# Patient Record
Sex: FEMALE | Race: BLACK OR AFRICAN AMERICAN | NOT HISPANIC OR LATINO | Employment: FULL TIME | URBAN - METROPOLITAN AREA
[De-identification: names, ages, dates, MRNs, and addresses within clinical notes are randomized per-mention and may not be internally consistent; named-entity substitution may affect disease eponyms.]

---

## 2017-02-06 DIAGNOSIS — E87.5 HYPERKALEMIA: Primary | ICD-10-CM

## 2017-02-11 ENCOUNTER — RESULTS ENCOUNTER (OUTPATIENT)
Dept: INTERNAL MEDICINE | Facility: CLINIC | Age: 57
End: 2017-02-11

## 2017-02-11 DIAGNOSIS — E87.5 HYPERKALEMIA: ICD-10-CM

## 2017-02-20 ENCOUNTER — TELEPHONE (OUTPATIENT)
Dept: ENDOCRINOLOGY | Age: 57
End: 2017-02-20

## 2017-02-20 ENCOUNTER — OFFICE VISIT (OUTPATIENT)
Dept: ENDOCRINOLOGY | Age: 57
End: 2017-02-20

## 2017-02-20 VITALS
HEIGHT: 67 IN | DIASTOLIC BLOOD PRESSURE: 72 MMHG | SYSTOLIC BLOOD PRESSURE: 116 MMHG | WEIGHT: 174.4 LBS | HEART RATE: 72 BPM | OXYGEN SATURATION: 98 % | BODY MASS INDEX: 27.37 KG/M2

## 2017-02-20 DIAGNOSIS — I10 ESSENTIAL HYPERTENSION: ICD-10-CM

## 2017-02-20 DIAGNOSIS — G47.33 OBSTRUCTIVE SLEEP APNEA ON CPAP: ICD-10-CM

## 2017-02-20 DIAGNOSIS — I25.10 ARTERIOSCLEROSIS OF CORONARY ARTERY: Primary | ICD-10-CM

## 2017-02-20 DIAGNOSIS — Z79.4 TYPE 2 DIABETES MELLITUS WITH COMPLICATION, WITH LONG-TERM CURRENT USE OF INSULIN (HCC): ICD-10-CM

## 2017-02-20 DIAGNOSIS — I25.5 ISCHEMIC CARDIOMYOPATHY: ICD-10-CM

## 2017-02-20 DIAGNOSIS — E78.5 HYPERLIPIDEMIA, UNSPECIFIED HYPERLIPIDEMIA TYPE: ICD-10-CM

## 2017-02-20 DIAGNOSIS — E08.319 DIABETIC RETINOPATHY ASSOCIATED WITH DIABETES MELLITUS DUE TO UNDERLYING CONDITION, MACULAR EDEMA PRESENCE UNSPECIFIED, UNSPECIFIED RETINOPATHY SEVERITY: ICD-10-CM

## 2017-02-20 DIAGNOSIS — E11.8 TYPE 2 DIABETES MELLITUS WITH COMPLICATION, WITH LONG-TERM CURRENT USE OF INSULIN (HCC): ICD-10-CM

## 2017-02-20 DIAGNOSIS — R80.9 MICROALBUMINURIA: ICD-10-CM

## 2017-02-20 DIAGNOSIS — Z99.89 OBSTRUCTIVE SLEEP APNEA ON CPAP: ICD-10-CM

## 2017-02-20 PROCEDURE — 99214 OFFICE O/P EST MOD 30 MIN: CPT | Performed by: INTERNAL MEDICINE

## 2017-02-20 RX ORDER — BUMETANIDE 2 MG/1
2 TABLET ORAL 2 TIMES DAILY
COMMUNITY

## 2017-02-20 RX ORDER — HYDRALAZINE HYDROCHLORIDE 50 MG/1
50 TABLET, FILM COATED ORAL 3 TIMES DAILY
COMMUNITY

## 2017-02-20 RX ORDER — POTASSIUM CHLORIDE 750 MG/1
10 TABLET, FILM COATED, EXTENDED RELEASE ORAL DAILY
COMMUNITY
End: 2017-07-14

## 2017-02-20 NOTE — TELEPHONE ENCOUNTER
----- Message from Ivory Shepard sent at 2/20/2017  3:02 PM EST -----  [facility] on nikia Mccarty pharmacist please call [phone]

## 2017-02-20 NOTE — PROGRESS NOTES
Subjective   Noemí Pedraza is a 56 y.o. female.     HPI Comments: F/u for dm 2 testing bs 1-2 x day / last dm eye exam sept 2016/ last dm foot exam today with dr Bowman     Diabetes     Patient is a 56-year-old female came in for follow-up  Patient has known diabetes mellitus since 1997 and started on insulin in 2009. She is presently on Toujeo 28 units every AM and 10 units every PM  and Humalog 0 units at breakfast, 6 units at lunch, and 6 units at supper. She checks her blood sugar 1 time per day.   Fasting blood sugar runs between 55 -104.  She is compliant with her diet. She has occasional hypoglycemia in the early morning. She has lost 30 pounds since March 2016.   Her last meal was at 10 AM.     Her last eye examination was 12/16 with Dr. Smith. She had previous retinal laser treatment. She has microalbuminuria on urine sample taken last July 2015. She was on valsartan in the past but does not know why it was taken off.. She complains of occasional tingling in her feet but no pain.     She has hyperlipidemia and stopped pravastatin since December 2016 because of leg shaking at night. She denies any myalgia. Lipitor cause muscle weakness in the past. She has not used Crestor or Zocor before.     She has history of coronary disease and ischemic cardiomyopathy and has a defibrillator in place. She had a myocardial infarction in 2009. She denies any chest pain, orthopnea, or paroxysmal nocturnal dyspnea. She has not had any defibrillator discharge. She was started on amiodarone in January 2016. She was admitted at [facility] in January 2017 for congestive heart failure.  She is on isosorbide dinitrate 20 mg 3 times a day, hydralazine 50 mg 3 times a day, Bumex 2 mg once a day, and amiodarone 200 mg once a day.  She follows with Dr. Woodard.    She has no previous history of thyroid disease.  Thyroid function test done in September 2016 showed a normal free T4 at 1.29 ng per DL and a mildly elevated TSH of  "4.480 with an upper limit of normal of 4.2.    The following portions of the patient's history were reviewed and updated as appropriate: allergies, current medications, past family history, past medical history, past social history, past surgical history and problem list.    Review of Systems   Constitutional: Negative.    HENT: Negative.    Eyes: Negative.    Respiratory: Negative.    Cardiovascular: Negative.    Gastrointestinal: Negative.    Endocrine: Negative.    Genitourinary: Positive for frequency.   Musculoskeletal: Negative.    Skin: Negative.    Allergic/Immunologic: Negative.    Neurological: Negative.    Hematological: Bruises/bleeds easily (on aspirin ).   Psychiatric/Behavioral: Positive for sleep disturbance (sleep apnea on c pap machine ).       Objective      Vitals:    02/20/17 1354   BP: 116/72   BP Location: Right arm   Patient Position: Sitting   Cuff Size: Large Adult   Pulse: 72   SpO2: 98%   Weight: 174 lb 6.4 oz (79.1 kg)   Height: 67\" (170.2 cm)     Physical Exam   Constitutional: She is oriented to person, place, and time. She appears well-developed and well-nourished. No distress.   HENT:   Head: Normocephalic.   Nose: Nose normal.   Mouth/Throat: No oropharyngeal exudate.   Eyes: Conjunctivae and EOM are normal. Right eye exhibits no discharge. Left eye exhibits no discharge. No scleral icterus.   Neck: Neck supple. No JVD present. No tracheal deviation present. No thyromegaly present.   Cardiovascular: Normal rate, regular rhythm, normal heart sounds and intact distal pulses.  Exam reveals no friction rub.    No murmur heard.  Palpable dorsalis pedis pulses   Pulmonary/Chest: Effort normal and breath sounds normal. No respiratory distress. She has no wheezes. She has no rales.   Abdominal: Soft. Bowel sounds are normal. She exhibits no distension and no mass. There is no tenderness.   Musculoskeletal: Normal range of motion. She exhibits no edema or tenderness.   No plantar ulcers "   Lymphadenopathy:     She has no cervical adenopathy.   Neurological: She is alert and oriented to person, place, and time. She displays normal reflexes.   Intact light touch and vibration on both lower extremities   Skin: Skin is warm and dry. No pallor.   Psychiatric: She has a normal mood and affect. Her behavior is normal.     Office Visit on 09/21/2016   Component Date Value Ref Range Status   • Hemoglobin A1C 09/21/2016 8.50* 4.80 - 5.60 % Final    Comment: Hemoglobin A1C Ranges:  Increased Risk for Diabetes  5.7% to 6.4%  Diabetes                     >= 6.5%  Diabetic Goal                < 7.0%     • WBC 09/21/2016 5.41  4.50 - 10.70 10*3/mm3 Final   • RBC 09/21/2016 5.14  3.90 - 5.20 10*6/mm3 Final   • Hemoglobin 09/21/2016 14.4  11.9 - 15.5 g/dL Final   • Hematocrit 09/21/2016 44.2  35.6 - 45.5 % Final   • MCV 09/21/2016 86.0  80.5 - 98.2 fL Final   • MCH 09/21/2016 28.0  26.9 - 32.7 pg Final   • MCHC 09/21/2016 32.6  32.4 - 36.3 g/dL Final   • RDW 09/21/2016 14.8* 11.7 - 13.0 % Final   • Platelets 09/21/2016 183  140 - 500 10*3/mm3 Final   • Neutrophil Rel % 09/21/2016 59.9  42.7 - 76.0 % Final   • Lymphocyte Rel % 09/21/2016 28.3  19.6 - 45.3 % Final   • Monocyte Rel % 09/21/2016 8.5  5.0 - 12.0 % Final   • Eosinophil Rel % 09/21/2016 3.1  0.3 - 6.2 % Final   • Basophil Rel % 09/21/2016 0.2  0.0 - 1.5 % Final   • Neutrophils Absolute 09/21/2016 3.24  1.90 - 8.10 10*3/mm3 Final   • Lymphocytes Absolute 09/21/2016 1.53  0.90 - 4.80 10*3/mm3 Final   • Monocytes Absolute 09/21/2016 0.46  0.20 - 1.20 10*3/mm3 Final   • Eosinophils Absolute 09/21/2016 0.17  0.00 - 0.70 10*3/mm3 Final   • Basophils Absolute 09/21/2016 0.01  0.00 - 0.20 10*3/mm3 Final   • Immature Granulocyte Rel % 09/21/2016 0.0  0.0 - 0.5 % Final   • Immature Grans Absolute 09/21/2016 0.00  0.00 - 0.03 10*3/mm3 Final   • Glucose 09/21/2016 95  65 - 99 mg/dL Final   • BUN 09/21/2016 30* 6 - 20 mg/dL Final   • Creatinine 09/21/2016 1.21* 0.57 -  1.00 mg/dL Final   • eGFR Non  Am 09/21/2016 46* >60 mL/min/1.73 Final    <15 Indicative of kidney failure.   • eGFR  Am 09/21/2016 56* >60 mL/min/1.73 Final    <15 Indicative of kidney failure.   • BUN/Creatinine Ratio 09/21/2016 24.8  7.0 - 25.0 Final   • Sodium 09/21/2016 142  136 - 145 mmol/L Final   • Potassium 09/21/2016 4.4  3.5 - 5.2 mmol/L Final   • Chloride 09/21/2016 96* 98 - 107 mmol/L Final   • Total CO2 09/21/2016 30.8* 22.0 - 29.0 mmol/L Final   • Calcium 09/21/2016 9.8  8.6 - 10.5 mg/dL Final   • Total Protein 09/21/2016 6.8  6.0 - 8.5 g/dL Final   • Albumin 09/21/2016 3.70  3.50 - 5.20 g/dL Final   • Globulin 09/21/2016 3.1  gm/dL Final   • A/G Ratio 09/21/2016 1.2  g/dL Final   • Total Bilirubin 09/21/2016 0.4  0.1 - 1.2 mg/dL Final   • Alkaline Phosphatase 09/21/2016 192* 39 - 117 U/L Final   • AST (SGOT) 09/21/2016 26  1 - 32 U/L Final   • ALT (SGPT) 09/21/2016 20  1 - 33 U/L Final   • Free T4 09/21/2016 1.29  0.93 - 1.70 ng/dL Final   • TSH 09/21/2016 4.480* 0.270 - 4.200 mIU/mL Final   • Specific Gravity, UA 09/21/2016 1.016  1.005 - 1.030 Final   • pH, UA 09/21/2016 6.0  5.0 - 8.0 Final   • Color, UA 09/21/2016 Yellow   Final    REFERENCE RANGE: Yellow, Straw   • Appearance, UA 09/21/2016 Clear  Clear Final   • Leukocytes, UA 09/21/2016 Comment  Negative Final    Negative   • Protein 09/21/2016 See below:* Negative Final    100 mg/dL (2+)   • Glucose, UA 09/21/2016 Comment  Negative Final    Negative   • Ketones 09/21/2016 Comment  Negative Final    Negative   • Blood, UA 09/21/2016 See below:* Negative Final    Small (1+)   • Bilirubin, UA 09/21/2016 Comment  Negative Final    Negative   • Urobilinogen, UA 09/21/2016 Comment   Final    Comment: 0.2 E.U./dL  REFERENCE RANGE: 0.2 E.U./dL, 1.0 E.U./dL     • Nitrite, UA 09/21/2016 Comment  Negative Final    Negative   • WBC 09/21/2016 0-2* None Seen /hpf Final   • RBC, UA 09/21/2016 0-2* None Seen /hpf Final   • Epithelial Cells  (non renal) 09/21/2016 3-6* /hpf Final    REFERENCE RANGE: None Seen, 0-2   • Cast Type 09/21/2016 Comment   Final    HYALINE CASTS, UA            0-2              /LPF       None Seen  N   • Bacteria, UA 09/21/2016 Comment  None Seen /hpf Final    None Seen     Assessment/Plan   Noemí was seen today for diabetes.    Diagnoses and all orders for this visit:    Arteriosclerosis of coronary artery    Essential hypertension    Obstructive sleep apnea on CPAP    Ischemic cardiomyopathy    Type 2 diabetes mellitus with complication, with long-term current use of insulin  -     Discontinue: Insulin Glargine (TOUJEO SOLOSTAR) 300 UNIT/ML solution pen-injector; Inject 52 Units under the skin Daily. 35 units every morning  -     Comprehensive Metabolic Panel  -     Microalbumin / Creatinine Urine Ratio  -     Hemoglobin A1c  -     Lipid Panel  -     TSH  -     T4, Free  -     Insulin Glargine (TOUJEO SOLOSTAR) 300 UNIT/ML solution pen-injector; Inject 35 Units under the skin Daily.    Microalbuminuria  -     Microalbumin / Creatinine Urine Ratio  -     DIEGO + PE    Diabetic retinopathy associated with diabetes mellitus due to underlying condition, macular edema presence unspecified, unspecified retinopathy severity    Hyperlipidemia, unspecified hyperlipidemia type  -     Lipid Panel  -     TSH  -     T4, Free      Decrease Toujeo to 35 units every morning.  Continue mealtime Humalog.  Check blood sugars before meals and at bedtime and call with results in one week.  Check lipid profile.  Consider Livalo.  Check thyroid function tests.    Send copy of my notes and labs to Dr. Foote and Dr. Woodard.    RTC 4 mos.

## 2017-02-21 LAB
ALBUMIN SERPL ELPH-MCNC: 4 G/DL (ref 2.9–4.4)
ALBUMIN SERPL-MCNC: 4.1 G/DL (ref 3.5–5.2)
ALBUMIN/CREAT UR: 437.6 MG/G CREAT (ref 0–30)
ALBUMIN/GLOB SERPL: 1.3 G/DL
ALBUMIN/GLOB SERPL: 1.3 {RATIO} (ref 0.7–1.7)
ALP SERPL-CCNC: 217 U/L (ref 39–117)
ALPHA1 GLOB SERPL ELPH-MCNC: 0.2 G/DL (ref 0–0.4)
ALPHA2 GLOB SERPL ELPH-MCNC: 0.8 G/DL (ref 0.4–1)
ALT SERPL-CCNC: 33 U/L (ref 1–33)
AST SERPL-CCNC: 42 U/L (ref 1–32)
B-GLOBULIN SERPL ELPH-MCNC: 1.3 G/DL (ref 0.7–1.3)
BILIRUB SERPL-MCNC: 0.3 MG/DL (ref 0.1–1.2)
BUN SERPL-MCNC: 35 MG/DL (ref 6–20)
BUN/CREAT SERPL: 27.1 (ref 7–25)
CALCIUM SERPL-MCNC: 9.5 MG/DL (ref 8.6–10.5)
CHLORIDE SERPL-SCNC: 98 MMOL/L (ref 98–107)
CHOLEST SERPL-MCNC: 275 MG/DL (ref 0–200)
CO2 SERPL-SCNC: 30.7 MMOL/L (ref 22–29)
CREAT SERPL-MCNC: 1.29 MG/DL (ref 0.57–1)
CREAT UR-MCNC: 26.6 MG/DL
GAMMA GLOB SERPL ELPH-MCNC: 1 G/DL (ref 0.4–1.8)
GLOBULIN SER CALC-MCNC: 3.2 GM/DL
GLOBULIN SER-MCNC: 3.3 G/DL (ref 2.2–3.9)
GLUCOSE SERPL-MCNC: 73 MG/DL (ref 65–99)
HBA1C MFR BLD: 7.48 % (ref 4.8–5.6)
HDLC SERPL-MCNC: 81 MG/DL (ref 40–60)
IGA SERPL-MCNC: 193 MG/DL (ref 87–352)
IGG SERPL-MCNC: 1301 MG/DL (ref 700–1600)
IGM SERPL-MCNC: 65 MG/DL (ref 26–217)
INTERPRETATION SERPL IEP-IMP: NORMAL
LDLC SERPL CALC-MCNC: 172 MG/DL (ref 0–100)
Lab: NORMAL
M PROTEIN SERPL ELPH-MCNC: NORMAL G/DL
MICROALBUMIN UR-MCNC: 116.4 UG/ML
POTASSIUM SERPL-SCNC: 4.2 MMOL/L (ref 3.5–5.2)
PROT SERPL-MCNC: 7.3 G/DL (ref 6–8.5)
SODIUM SERPL-SCNC: 143 MMOL/L (ref 136–145)
T4 FREE SERPL-MCNC: 1.12 NG/DL (ref 0.93–1.7)
TRIGL SERPL-MCNC: 108 MG/DL (ref 0–150)
TSH SERPL DL<=0.005 MIU/L-ACNC: 4.04 MIU/ML (ref 0.27–4.2)
VLDLC SERPL CALC-MCNC: 21.6 MG/DL (ref 5–40)

## 2017-02-22 ENCOUNTER — TELEPHONE (OUTPATIENT)
Dept: ENDOCRINOLOGY | Age: 57
End: 2017-02-22

## 2017-02-22 DIAGNOSIS — I10 ESSENTIAL HYPERTENSION: Primary | ICD-10-CM

## 2017-02-22 DIAGNOSIS — E78.49 OTHER HYPERLIPIDEMIA: ICD-10-CM

## 2017-02-22 NOTE — TELEPHONE ENCOUNTER
----- Message from Ivory Shepard sent at 2/22/2017 12:41 PM EST -----  Patient is returning you call regarding labs

## 2017-03-08 ENCOUNTER — OFFICE VISIT (OUTPATIENT)
Dept: INTERNAL MEDICINE | Facility: CLINIC | Age: 57
End: 2017-03-08

## 2017-03-08 VITALS
HEIGHT: 67 IN | TEMPERATURE: 97.3 F | WEIGHT: 178 LBS | BODY MASS INDEX: 27.94 KG/M2 | OXYGEN SATURATION: 96 % | DIASTOLIC BLOOD PRESSURE: 76 MMHG | SYSTOLIC BLOOD PRESSURE: 125 MMHG | HEART RATE: 80 BPM

## 2017-03-08 DIAGNOSIS — Z01.419 ENCOUNTER FOR GYNECOLOGICAL EXAMINATION WITHOUT ABNORMAL FINDING: ICD-10-CM

## 2017-03-08 DIAGNOSIS — G47.33 OBSTRUCTIVE SLEEP APNEA ON CPAP: ICD-10-CM

## 2017-03-08 DIAGNOSIS — Z99.89 OBSTRUCTIVE SLEEP APNEA ON CPAP: ICD-10-CM

## 2017-03-08 DIAGNOSIS — IMO0001 IDDM (INSULIN DEPENDENT DIABETES MELLITUS): ICD-10-CM

## 2017-03-08 DIAGNOSIS — J45.991 COUGH VARIANT ASTHMA: ICD-10-CM

## 2017-03-08 DIAGNOSIS — I10 ESSENTIAL HYPERTENSION: ICD-10-CM

## 2017-03-08 DIAGNOSIS — Z00.00 MEDICARE ANNUAL WELLNESS VISIT, INITIAL: ICD-10-CM

## 2017-03-08 DIAGNOSIS — I25.10 CORONARY ARTERY DISEASE INVOLVING NATIVE CORONARY ARTERY OF NATIVE HEART WITHOUT ANGINA PECTORIS: ICD-10-CM

## 2017-03-08 DIAGNOSIS — Z09 HOSPITAL DISCHARGE FOLLOW-UP: Primary | ICD-10-CM

## 2017-03-08 DIAGNOSIS — I50.22 CHRONIC SYSTOLIC CONGESTIVE HEART FAILURE (HCC): ICD-10-CM

## 2017-03-08 DIAGNOSIS — Z12.11 ENCOUNTER FOR SCREENING COLONOSCOPY: ICD-10-CM

## 2017-03-08 DIAGNOSIS — E78.49 OTHER HYPERLIPIDEMIA: ICD-10-CM

## 2017-03-08 PROCEDURE — G0438 PPPS, INITIAL VISIT: HCPCS | Performed by: INTERNAL MEDICINE

## 2017-03-08 PROCEDURE — 99214 OFFICE O/P EST MOD 30 MIN: CPT | Performed by: INTERNAL MEDICINE

## 2017-03-08 NOTE — PATIENT INSTRUCTIONS
Medicare Wellness  Personal Prevention Plan of Service     Date of Office Visit:  2017  Encounter Provider:  Zay Foote MD  Place of Service:  [facility]  Patient Name: Noemí Pedraza  :  1960    As part of the Medicare Wellness portion of your visit today, we are providing you with this personalized preventive plan of services (PPPS). This plan is based upon recommendations of the [place] Preventive Services Task Force (USPSTF) and the Advisory Committee on Immunization Practices (ACIP).    This lists the preventive care services that should be considered, and provides dates of when you are due. Items listed as completed are up-to-date and do not require any further intervention.    Health Maintenance   Topic Date Due   • TDAP/TD VACCINES (1 - Tdap) 1977   • COLONOSCOPY  2016   • PAP SMEAR  2016   • DIABETIC EYE EXAM  2017   • LIPID PANEL  2018   • HEMOGLOBIN A1C  2018   • URINE MICROALBUMIN  2018   • MEDICARE ANNUAL WELLNESS  2018   • DIABETIC FOOT EXAM  2018   • MAMMOGRAM  2018   • PNEUMOCOCCAL VACCINE (19-64 MEDIUM RISK)  Addressed   • HEPATITIS C SCREENING  Addressed   • INFLUENZA VACCINE  Addressed         Patient Self-Management and Personalized Health Advice  The patient has been provided with information about: diet and preventive services including:   · Nutrition counseling provided.    Visit Diagnoses:  No diagnosis found.    No orders of the defined types were placed in this encounter.      Outpatient Encounter Prescriptions as of 3/8/2017   Medication Sig Dispense Refill   • amiodarone (PACERONE) 200 MG tablet Take 200 mg by mouth daily.     • aspirin 81 MG tablet Take 81 mg by mouth daily.     • bumetanide (BUMEX) 2 MG tablet Take 2 mg by mouth Daily.     • Coenzyme Q10 (CO Q-10) 100 MG capsule Take 1 tablet/day by mouth every day.     • ferrous sulfate 325 (65 FE) MG EC tablet  Take 65 mg by mouth Daily With Breakfast.     • glucose blood test strip  OneTouch Verio In Vitro Strip 4 TIMES DAILY   DX CODE E 11.65 200 each 1   • HUMALOG KWIKPEN 100 UNIT/ML solution pen-injector INJECT 10-14 UNITS 3 X DAILY (Patient taking differently: INJECT 6 units with dinner   Average 18 units daily) 15 pen 2   • hydrALAZINE (APRESOLINE) 50 MG tablet Take 50 mg by mouth 3 (Three) Times a Day.     • Insulin Glargine (TOUJEO SOLOSTAR) 300 UNIT/ML solution pen-injector Inject 35 Units under the skin Daily. 15 pen 1   • Insulin Pen Needle 32G X 4 MM misc      • isosorbide mononitrate (IMDUR) 60 MG 24 hr tablet Take 20 mg by mouth 3 (Three) Times a Day.     • mometasone-formoterol (DULERA 100) 100-5 MCG/ACT inhaler Inhale 2 puffs 2 (two) times a day. 3 inhaler 11   • ONETOUCH DELICA LANCETS 33G misc      • pitavastatin calcium (LIVALO) 2 MG tablet tablet Take 1/2 tablet at night 30 tablet 5   • potassium chloride (K-DUR,KLOR-CON) 10 MEQ ER tablet Take 10 mEq by mouth Daily.       No facility-administered encounter medications on file as of 3/8/2017.        Reviewed use of high risk medication in the elderly: yes  Reviewed for potential of harmful drug interactions in the elderly: yes    Follow Up:  No Follow-up on file.     An After Visit Summary and PPPS with all of these plans were given to the patient.

## 2017-03-08 NOTE — PROGRESS NOTES
Subjective   Noemí Pedraza is a 56 y.o. female.   She is here today for Medicare annual wellness visit initial along with Hospital discharge follow-up for chronic systolic congestive heart failure as well as coronary artery disease hypertension hyper lipidemia obstructive sleep apnea cough variant asthma insulin-dependent diabetes and encounter for screening colonoscopy and encounter for gynecological examination also  History of Present Illness   She is here today for Medicare annual wellness visit along with Hospital discharge follow-up for chronic systolic heart failure and coronary artery disease along with hypertension hyperlipidemia obstructive sleep apnea cough variant asthma insulin-dependent diabetes and encounter for screening colonoscopy and encounter for gynecological exam as well  The following portions of the patient's history were reviewed and updated as appropriate: allergies, current medications, past family history, past medical history, past social history, past surgical history and problem list.    Review of Systems   All other systems reviewed and are negative.      Objective   Physical Exam   Constitutional: She is oriented to person, place, and time. Vital signs are normal. She appears well-developed and well-nourished. She is active and cooperative.   HENT:   Head: Normocephalic and atraumatic.   Right Ear: Hearing, tympanic membrane, external ear and ear canal normal.   Left Ear: Hearing, tympanic membrane, external ear and ear canal normal.   Nose: Nose normal.   Mouth/Throat: Uvula is midline, oropharynx is clear and moist and mucous membranes are normal.   Eyes: Conjunctivae, EOM and lids are normal. Pupils are equal, round, and reactive to light. Right eye exhibits no discharge. Left eye exhibits no discharge.   Neck: Trachea normal, normal range of motion, full passive range of motion without pain and phonation normal. Neck supple. Carotid bruit is not present. No edema present. No  thyroid mass and no thyromegaly present.   Cardiovascular: Normal rate, regular rhythm, normal heart sounds, intact distal pulses and normal pulses.  Exam reveals no gallop and no friction rub.    No murmur heard.  Pulmonary/Chest: Effort normal and breath sounds normal. No respiratory distress. She has no wheezes. She has no rales.   Abdominal: Soft. Normal appearance, normal aorta and bowel sounds are normal. She exhibits no distension, no abdominal bruit and no mass. There is no hepatosplenomegaly. There is no tenderness. There is no rebound, no guarding and no CVA tenderness. No hernia. Hernia confirmed negative in the right inguinal area and confirmed negative in the left inguinal area.   Musculoskeletal: Normal range of motion. She exhibits no edema or tenderness.    Noemí had a diabetic foot exam performed today.   Monofilament test performed: nl.    Vascular Status -  Her exam exhibits right foot vasculature normal. Her exam exhibits no right foot edema. Her exam exhibits left foot vasculature normal. Her exam exhibits no left foot edema.   Skin Integrity  -  Her right foot skin is intact.     Noemí 's left foot skin is intact. .  Lymphadenopathy:     She has no cervical adenopathy.     She has no axillary adenopathy.        Right: No inguinal and no supraclavicular adenopathy present.        Left: No inguinal and no supraclavicular adenopathy present.   Neurological: She is alert and oriented to person, place, and time. She has normal strength. No cranial nerve deficit or sensory deficit. She exhibits normal muscle tone. She displays a negative Romberg sign. Coordination and gait normal.   Skin: Skin is warm, dry and intact. No cyanosis. Nails show no clubbing.   Psychiatric: She has a normal mood and affect. Her speech is normal and behavior is normal. Judgment and thought content normal. Cognition and memory are normal.   Nursing note and vitals reviewed.      Assessment/Plan   Diagnoses and all  orders for this visit:    Medicare annual wellness visit, subsequent    Hospital discharge follow-up    Chronic systolic congestive heart failure    Coronary artery disease involving native coronary artery of native heart without angina pectoris    Essential hypertension    Other hyperlipidemia    Obstructive sleep apnea on CPAP    Cough variant asthma    IDDM (insulin dependent diabetes mellitus)    Encounter for screening colonoscopy  -     Ambulatory Referral For Screening Colonoscopy    Encounter for gynecological examination without abnormal finding  -     Ambulatory Referral to Gynecology      Medicare annual wellness visit initial follow recommendations also the computer will not let me remove subsequent visit and replace with initial visit  Hospital discharge follow-up doing better  Current chronic systolic congestive heart failure stable  Hypertension well-controlled on current medication  Coronary artery disease stable now at this time after hospital stay  Hyper lipidemia keep LDL less than 70 with proper diet exercise medication  Obstructive sleep apnea continue CPAP  Cough variant asthma supportive meds for this  Insulin-dependent diabetes mellitus follow hemoglobin A1c  Encounter for screening colonoscopy schedule  Encounter for gynecological exam schedule

## 2017-03-08 NOTE — PROGRESS NOTES
QUICK REFERENCE INFORMATION:  The ABCs of the Annual Wellness Visit    Initial Medicare Wellness Visit    HEALTH RISK ASSESSMENT    1960    Recent Hospitalizations:  Recently treated at the following:  Other: [facility].        Current Medical Providers:  Patient Care Team:  Zay Foote MD as PCP - General  Zay Foote MD as PCP - Family Medicine  Beto Woodard MD as Consulting Physician (Cardiology)  Yane Mosley MD as Consulting Physician        Smoking Status:  History   Smoking Status   • Never Smoker   Smokeless Tobacco   • Not on file       Alcohol Consumption:  History   Alcohol Use   • Yes     Comment: SOCIALLY        Depression Screen:   PHQ-9 Depression Screening 3/8/2017   Little interest or pleasure in doing things 0   Feeling down, depressed, or hopeless 0   Trouble falling or staying asleep, or sleeping too much 0   Feeling tired or having little energy 0   Poor appetite or overeating 0   Feeling bad about yourself - or that you are a failure or have let yourself or your family down 0   Trouble concentrating on things, such as reading the newspaper or watching television 0   Moving or speaking so slowly that other people could have noticed. Or the opposite - being so fidgety or restless that you have been moving around a lot more than usual 0   Thoughts that you would be better off dead, or of hurting yourself in some way 0   PHQ-9 Total Score 0   If you checked off any problems, how difficult have these problems made it for you to do your work, take care of things at home, or get along with other people? Not difficult at all       Health Habits and Functional and Cognitive Screening:  Functional & Cognitive Status 3/8/2017   Do you have difficulty preparing food and eating? No   Do you have difficulty bathing yourself? No   Do you have difficulty getting dressed? No   Do you have difficulty using the toilet? No   Do you have difficulty moving around from place to  place? No   In the past year have you fallen or experienced a near fall? No   Do you need help using the phone?  No   Are you deaf or do you have serious difficulty hearing?  No   Do you need help with transportation? No   Do you need help shopping? No   Do you need help preparing meals?  No   Do you need help with housework?  No   Do you need help with laundry? No   Do you need help taking your medications? No   Do you need help managing money? No   Do you have difficulty concentrating, remembering or making decisions? No                  Does the patient have evidence of cognitive impairment? Yes    Asprin use counseling:yes      Recent Lab Results:    Visual Acuity:  No exam data present    Age-appropriate Screening Schedule:  Refer to the list below for future screening recommendations based on patient's age, sex and/or medical conditions. Orders for these recommended tests are listed in the plan section. The patient has been provided with a written plan.    Health Maintenance   Topic Date Due   • PNEUMOCOCCAL VACCINE (19-64 MEDIUM RISK) (1 of 1 - PPSV23) 12/29/1977   • TDAP/TD VACCINES (1 - Tdap) 12/29/1977   • DIABETIC FOOT EXAM  03/01/2016   • COLONOSCOPY  03/01/2016   • PAP SMEAR  03/31/2016   • DIABETIC EYE EXAM  12/15/2016   • LIPID PANEL  02/20/2018   • HEMOGLOBIN A1C  02/20/2018   • URINE MICROALBUMIN  02/20/2018   • MAMMOGRAM  05/04/2018   • INFLUENZA VACCINE  Addressed        Subjective   History of Present Illness    Noemí Pedraza is a 56 y.o. female who presents for an Annual Wellness Visit.    The following portions of the patient's history were reviewed and updated as appropriate: allergies, current medications, past family history, past medical history, past social history, past surgical history and problem list.    Outpatient Medications Prior to Visit   Medication Sig Dispense Refill   • amiodarone (PACERONE) 200 MG tablet Take 200 mg by mouth daily.     • aspirin 81 MG tablet Take 81 mg by mouth  daily.     • bumetanide (BUMEX) 2 MG tablet Take 2 mg by mouth Daily.     • Coenzyme Q10 (CO Q-10) 100 MG capsule Take 1 tablet/day by mouth every day.     • ferrous sulfate 325 (65 FE) MG EC tablet Take 65 mg by mouth Daily With Breakfast.     • glucose blood test strip  OneTouch Verio In Vitro Strip 4 TIMES DAILY   DX CODE E 11.65 200 each 1   • HUMALOG KWIKPEN 100 UNIT/ML solution pen-injector INJECT 10-14 UNITS 3 X DAILY (Patient taking differently: INJECT 6 units with dinner   Average 18 units daily) 15 pen 2   • hydrALAZINE (APRESOLINE) 50 MG tablet Take 50 mg by mouth 3 (Three) Times a Day.     • Insulin Glargine (TOUJEO SOLOSTAR) 300 UNIT/ML solution pen-injector Inject 35 Units under the skin Daily. 15 pen 1   • Insulin Pen Needle 32G X 4 MM misc      • isosorbide mononitrate (IMDUR) 60 MG 24 hr tablet Take 20 mg by mouth 3 (Three) Times a Day.     • mometasone-formoterol (DULERA 100) 100-5 MCG/ACT inhaler Inhale 2 puffs 2 (two) times a day. 3 inhaler 11   • ONETOUCH DELICA LANCETS 33G misc      • pitavastatin calcium (LIVALO) 2 MG tablet tablet Take 1/2 tablet at night 30 tablet 5   • potassium chloride (K-DUR,KLOR-CON) 10 MEQ ER tablet Take 10 mEq by mouth Daily.       No facility-administered medications prior to visit.        Patient Active Problem List   Diagnosis   • Arteriosclerosis of coronary artery   • CCF (congestive cardiac failure)   • Essential hypertension   • Coronary artery disease   • Hyperlipidemia   • Ischemic cardiomyopathy   • Obstructive sleep apnea on CPAP   • Diabetic retinopathy   • Microalbuminuria   • Diabetes mellitus type 2, uncontrolled   • Head revolving around   • Visit for screening mammogram   • Acne   • Hospital discharge follow-up   • History of laryngitis   • Cough variant asthma   • Type 2 diabetes mellitus with complication, with long-term current use of insulin       Advanced Care Planning:  has an advanced directive - a copy has been provided and is in  "file    Identification of Risk Factors:  Risk factors include: cardiovascular risk.    Review of Systems    Compared to one year ago, the patient feels her physical health is better.  Compared to one year ago, the patient feels her mental health is better.    Objective     Physical Exam    Vitals:    03/08/17 1006   BP: 125/76   BP Location: Left arm   Patient Position: Sitting   Cuff Size: Adult   Pulse: 80   Temp: 97.3 °F (36.3 °C)   TempSrc: Tympanic   SpO2: 96%   Weight: 178 lb (80.7 kg)   Height: 67\" (170.2 cm)       Body mass index is 27.88 kg/(m^2).  Discussed the patient's BMI with her. The BMI is in the acceptable range.    Assessment/Plan   Patient Self-Management and Personalized Health Advice  The patient has been provided with information about: diet and preventive services including:   · Nutrition counseling provided.    Visit Diagnoses:  No diagnosis found.    No orders of the defined types were placed in this encounter.      Outpatient Encounter Prescriptions as of 3/8/2017   Medication Sig Dispense Refill   • amiodarone (PACERONE) 200 MG tablet Take 200 mg by mouth daily.     • aspirin 81 MG tablet Take 81 mg by mouth daily.     • bumetanide (BUMEX) 2 MG tablet Take 2 mg by mouth Daily.     • Coenzyme Q10 (CO Q-10) 100 MG capsule Take 1 tablet/day by mouth every day.     • ferrous sulfate 325 (65 FE) MG EC tablet Take 65 mg by mouth Daily With Breakfast.     • glucose blood test strip  OneTouch Verio In Vitro Strip 4 TIMES DAILY   DX CODE E 11.65 200 each 1   • HUMALOG KWIKPEN 100 UNIT/ML solution pen-injector INJECT 10-14 UNITS 3 X DAILY (Patient taking differently: INJECT 6 units with dinner   Average 18 units daily) 15 pen 2   • hydrALAZINE (APRESOLINE) 50 MG tablet Take 50 mg by mouth 3 (Three) Times a Day.     • Insulin Glargine (TOUJEO SOLOSTAR) 300 UNIT/ML solution pen-injector Inject 35 Units under the skin Daily. 15 pen 1   • Insulin Pen Needle 32G X 4 MM misc      • isosorbide mononitrate " (IMDUR) 60 MG 24 hr tablet Take 20 mg by mouth 3 (Three) Times a Day.     • mometasone-formoterol (DULERA 100) 100-5 MCG/ACT inhaler Inhale 2 puffs 2 (two) times a day. 3 inhaler 11   • ONETOUCH DELICA LANCETS 33G misc      • pitavastatin calcium (LIVALO) 2 MG tablet tablet Take 1/2 tablet at night 30 tablet 5   • potassium chloride (K-DUR,KLOR-CON) 10 MEQ ER tablet Take 10 mEq by mouth Daily.       No facility-administered encounter medications on file as of 3/8/2017.        Reviewed use of high risk medication in the elderly: yes  Reviewed for potential of harmful drug interactions in the elderly: yes    Follow Up:  No Follow-up on file.     An After Visit Summary and PPPS with all of these plans were given to the patient.

## 2017-03-19 PROBLEM — Z00.00 MEDICARE ANNUAL WELLNESS VISIT, INITIAL: Status: ACTIVE | Noted: 2017-03-19

## 2017-04-22 ENCOUNTER — RESULTS ENCOUNTER (OUTPATIENT)
Dept: ENDOCRINOLOGY | Age: 57
End: 2017-04-22

## 2017-04-22 DIAGNOSIS — I10 ESSENTIAL HYPERTENSION: ICD-10-CM

## 2017-04-22 DIAGNOSIS — E78.49 OTHER HYPERLIPIDEMIA: ICD-10-CM

## 2017-06-12 RX ORDER — METHYLPREDNISOLONE 4 MG/1
TABLET ORAL
Qty: 21 TABLET | Refills: 0 | Status: SHIPPED | OUTPATIENT
Start: 2017-06-12 | End: 2017-07-10

## 2017-06-12 RX ORDER — AMOXICILLIN AND CLAVULANATE POTASSIUM 875; 125 MG/1; MG/1
1 TABLET, FILM COATED ORAL 2 TIMES DAILY
Qty: 20 TABLET | Refills: 0 | Status: SHIPPED | OUTPATIENT
Start: 2017-06-12 | End: 2017-07-10

## 2017-06-12 RX ORDER — AZITHROMYCIN 250 MG/1
TABLET, FILM COATED ORAL
Qty: 6 TABLET | Refills: 0 | Status: CANCELLED | OUTPATIENT
Start: 2017-06-12

## 2017-07-10 ENCOUNTER — OFFICE VISIT (OUTPATIENT)
Dept: OBSTETRICS AND GYNECOLOGY | Facility: CLINIC | Age: 57
End: 2017-07-10

## 2017-07-10 VITALS
BODY MASS INDEX: 28.72 KG/M2 | HEIGHT: 67 IN | SYSTOLIC BLOOD PRESSURE: 100 MMHG | WEIGHT: 183 LBS | DIASTOLIC BLOOD PRESSURE: 60 MMHG

## 2017-07-10 DIAGNOSIS — Z13.9 SCREENING: ICD-10-CM

## 2017-07-10 DIAGNOSIS — E08.319 DIABETIC RETINOPATHY ASSOCIATED WITH DIABETES MELLITUS DUE TO UNDERLYING CONDITION, MACULAR EDEMA PRESENCE UNSPECIFIED, UNSPECIFIED RETINOPATHY SEVERITY: ICD-10-CM

## 2017-07-10 DIAGNOSIS — Z99.89 OBSTRUCTIVE SLEEP APNEA ON CPAP: ICD-10-CM

## 2017-07-10 DIAGNOSIS — IMO0001 IDDM (INSULIN DEPENDENT DIABETES MELLITUS): ICD-10-CM

## 2017-07-10 DIAGNOSIS — G47.33 OBSTRUCTIVE SLEEP APNEA ON CPAP: ICD-10-CM

## 2017-07-10 DIAGNOSIS — R42: ICD-10-CM

## 2017-07-10 DIAGNOSIS — Z01.419 WELL FEMALE EXAM WITH ROUTINE GYNECOLOGICAL EXAM: Primary | ICD-10-CM

## 2017-07-10 DIAGNOSIS — I50.22 CHRONIC SYSTOLIC CONGESTIVE HEART FAILURE (HCC): ICD-10-CM

## 2017-07-10 DIAGNOSIS — I25.10 CORONARY ARTERY DISEASE INVOLVING NATIVE CORONARY ARTERY OF NATIVE HEART WITHOUT ANGINA PECTORIS: ICD-10-CM

## 2017-07-10 DIAGNOSIS — E78.49 OTHER HYPERLIPIDEMIA: ICD-10-CM

## 2017-07-10 DIAGNOSIS — Z12.4 SCREENING FOR MALIGNANT NEOPLASM OF CERVIX: ICD-10-CM

## 2017-07-10 LAB
BILIRUB BLD-MCNC: NEGATIVE MG/DL
CLARITY, POC: CLEAR
COLOR UR: YELLOW
GLUCOSE UR STRIP-MCNC: NEGATIVE MG/DL
KETONES UR QL: NEGATIVE
LEUKOCYTE EST, POC: NEGATIVE
NITRITE UR-MCNC: NEGATIVE MG/ML
PH UR: 5 [PH] (ref 5–8)
PROT UR STRIP-MCNC: ABNORMAL MG/DL
RBC # UR STRIP: ABNORMAL /UL
SP GR UR: 1.02 (ref 1–1.03)
UROBILINOGEN UR QL: NORMAL

## 2017-07-10 PROCEDURE — 81002 URINALYSIS NONAUTO W/O SCOPE: CPT | Performed by: OBSTETRICS & GYNECOLOGY

## 2017-07-10 PROCEDURE — G0101 CA SCREEN;PELVIC/BREAST EXAM: HCPCS | Performed by: OBSTETRICS & GYNECOLOGY

## 2017-07-10 NOTE — PROGRESS NOTES
[facility]  Routine Annual Visit    7/10/2017    Patient: Noemí Pedraza          MR#:[id number]      History of Present Illness    56 y.o. female  who presents for annual exam.    The patient presents for routine annual exam feeling well without complaints.  The patient's past medical history is complicated by multiple factors and comorbidities including hypertension, poorly managed insulin-dependent diabetes mellitus, dyslipidemia, history of coronary artery disease, congestive heart failure    No LMP recorded (lmp unknown). Patient is postmenopausal.  Obstetric History:  OB History      Para Term  AB TAB SAB Ectopic Multiple Living    2 2 1 1      2         Menstrual History:  Menarche age: 10 years  No LMP recorded (lmp unknown). Patient is postmenopausal.  Period Cycle (Days): 28  Period Duration (Days): 5  Period Pattern: (!) Irregular  Menstrual Flow: Heavy, Moderate  Menstrual Control: Maxi pad    Sexual History:       ________________________________________  Patient Active Problem List   Diagnosis   • Arteriosclerosis of coronary artery   • CCF (congestive cardiac failure)   • Essential hypertension   • Coronary artery disease   • Hyperlipidemia   • Ischemic cardiomyopathy   • Obstructive sleep apnea on CPAP   • Diabetic retinopathy   • Microalbuminuria   • Diabetes mellitus type 2, uncontrolled   • Head revolving around   • Visit for screening mammogram   • Hospital discharge follow-up   • Cough variant asthma   • IDDM (insulin dependent diabetes mellitus)   • Medicare annual wellness visit, subsequent   • Encounter for screening colonoscopy   • Encounter for routine pelvic examination   • Medicare annual wellness visit, initial   • Well female exam with routine gynecological exam       Past Medical History:   Diagnosis Date   • Asthma    • Congestive heart failure    • Coronary artery disease    • Diabetic retinopathy    • Hyperlipidemia    • Hypertension    • Ischemic  cardiomyopathy     Dr. Woodard   • Microalbuminuria    • Myocardial infarction    • Obstructive sleep apnea on CPAP    • Right-sided carotid artery disease 2014    Status post carotid endarterectomy   • Sarcoidosis of skin    • Type 2 diabetes mellitus        Past Surgical History:   Procedure Laterality Date   • CARDIAC DEFIBRILLATOR PLACEMENT     • CAROTID ARTERY ANGIOPLASTY     • CAROTID ENDARTERECTOMY Right     Dr. Valdes   •  SECTION     • CHOLECYSTECTOMY         History   Smoking Status   • Never Smoker   Smokeless Tobacco   • Never Used       Family History   Problem Relation Age of Onset   • Diabetes Mother    • Hypertension Mother    • Heart disease Mother    • Heart attack Father    • Heart disease Father    • Obesity Brother        Prior to Admission medications    Medication Sig Start Date End Date Taking? Authorizing Provider   amiodarone (PACERONE) 200 MG tablet Take 200 mg by mouth daily.   Yes Historical Provider, MD   aspirin 81 MG tablet Take 81 mg by mouth daily. 14  Yes Historical Provider, MD   bumetanide (BUMEX) 2 MG tablet Take 2 mg by mouth Daily.   Yes Historical Provider, MD   Coenzyme Q10 (CO Q-10) 100 MG capsule Take 1 tablet/day by mouth every day.   Yes Historical Provider, MD   glucose blood test strip  OneTouch Verio In Vitro Strip 4 TIMES DAILY   DX CODE E 11.65 10/11/16  Yes Brandan Bowman MD   HUMALOG KWIKPEN 100 UNIT/ML solution pen-injector INJECT 10-14 UNITS 3 X DAILY  Patient taking differently: INJECT 6 units with dinner   Average 18 units daily 6/3/16  Yes Brandan Bowman MD   hydrALAZINE (APRESOLINE) 50 MG tablet Take 50 mg by mouth 3 (Three) Times a Day.   Yes Historical Provider, MD   Insulin Glargine (TOUJEO SOLOSTAR) 300 UNIT/ML solution pen-injector Inject 35 Units under the skin Daily. 17  Yes Brandan Bowman MD   Insulin Pen Needle 32G X 4 MM misc  7/21/15  Yes Historical Provider, MD   isosorbide mononitrate (IMDUR) 60 MG 24 hr tablet  "Take 20 mg by mouth 3 (Three) Times a Day. 8/2/14  Yes Historical Provider, MD   mometasone-formoterol (DULERA 100) 100-5 MCG/ACT inhaler Inhale 2 puffs 2 (two) times a day. 9/21/16  Yes Zay Foote MD   ALIYATOUCH DELICA LANCETS 33G misc  7/21/15  Yes Historical Provider, MD   pitavastatin calcium (LIVALO) 2 MG tablet tablet Take 1/2 tablet at night 2/23/17  Yes Brandan Bowman MD   potassium chloride (K-DUR,KLOR-CON) 10 MEQ ER tablet Take 10 mEq by mouth Daily.   Yes Historical Provider, MD   amoxicillin-clavulanate (AUGMENTIN) 875-125 MG per tablet Take 1 tablet by mouth 2 (Two) Times a Day. 6/12/17 7/10/17  Zay Foote MD   ferrous sulfate 325 (65 FE) MG EC tablet Take 65 mg by mouth Daily With Breakfast. 6/26/14 7/10/17  Historical Provider, MD   MethylPREDNISolone (MEDROL, JERONIMO,) 4 MG tablet Take as directed on package instructions. 6/12/17 7/10/17  Zay Foote MD     ________________________________________    Current contraception: post menopausal status  History of abnormal Pap smear: no  Family history of uterine or ovarian cancer: no  Family History of colon cancer/colon polyps: no  History of abnormal mammogram: no  History of abnormal lipids: yes - treated    The following portions of the patient's history were reviewed and updated as appropriate: allergies, current medications, past family history, past medical history, past social history, past surgical history and problem list.    Review of Systems    Pertinent items are noted in HPI.     Objective   Physical Exam    /60  Ht 67\" (170.2 cm)  Wt 183 lb (83 kg)  LMP  (LMP Unknown)  Breastfeeding? No  BMI 28.66 kg/m2   BP Readings from Last 3 Encounters:   07/10/17 100/60   03/08/17 125/76   02/20/17 116/72      Wt Readings from Last 3 Encounters:   07/10/17 183 lb (83 kg)   03/08/17 178 lb (80.7 kg)   02/20/17 174 lb 6.4 oz (79.1 kg)        BMI: Estimated body mass index is 28.66 kg/(m^2) as calculated from the " "following:    Height as of this encounter: 67\" (170.2 cm).    Weight as of this encounter: 183 lb (83 kg).    General:   alert, appears stated age and cooperative   Heart: regular rate and rhythm, S1, S2 normal, no murmur, click, rub or gallop   Lungs: clear to auscultation bilaterally   Abdomen: soft, non-tender, without masses or organomegaly   Breast: inspection negative, no nipple discharge or bleeding, no masses or nodularity palpable   Vulva: normal   Vagina: normal mucosa   Cervix: no lesions   Uterus: normal size   Adnexa: exam limited by habitus     As part of wellness and prevention, the following topics were discussed with the patient:    []  Nutrition  [x]  Physical activity/regular exercise   [x]  Healthy weight  []  Injury prevention  []  Substance misuse/abuse  []  Sexual behavior  []  STD prevention  []  Contaception  []  Dental health  []  Mental health  []  Immunization  [x]  Encouraged SBE       Assessment:    Noemí was seen today for establish care.    Diagnoses and all orders for this visit:    Well female exam with routine gynecological exam    Screening  -     POC Urinalysis Dipstick    Screening for malignant neoplasm of cervix  -     IgP, Aptima HPV  -     Mammo Screening Bilateral With CAD; Future    Obstructive sleep apnea on CPAP    Other hyperlipidemia    IDDM (insulin dependent diabetes mellitus)    Diabetic retinopathy associated with diabetes mellitus due to underlying condition, macular edema presence unspecified, unspecified retinopathy severity    Coronary artery disease involving native coronary artery of native heart without angina pectoris    Chronic systolic congestive heart failure          Plan:  [x]  Colonoscopy: referred, pt needs to schedule.           Curt Dixon MD  7/10/2017 10:22 AM  "

## 2017-07-12 ENCOUNTER — TELEPHONE (OUTPATIENT)
Dept: OBSTETRICS AND GYNECOLOGY | Facility: CLINIC | Age: 57
End: 2017-07-12

## 2017-07-12 LAB
CYTOLOGIST CVX/VAG CYTO: NORMAL
CYTOLOGY CVX/VAG DOC THIN PREP: NORMAL
DX ICD CODE: NORMAL
HIV 1 & 2 AB SER-IMP: NORMAL
HPV I/H RISK 4 DNA CVX QL PROBE+SIG AMP: NEGATIVE
OTHER STN SPEC: NORMAL
PATH REPORT.FINAL DX SPEC: NORMAL
STAT OF ADQ CVX/VAG CYTO-IMP: NORMAL

## 2017-07-14 ENCOUNTER — OFFICE VISIT (OUTPATIENT)
Dept: ENDOCRINOLOGY | Age: 57
End: 2017-07-14

## 2017-07-14 ENCOUNTER — HOSPITAL ENCOUNTER (OUTPATIENT)
Dept: MAMMOGRAPHY | Facility: HOSPITAL | Age: 57
Discharge: HOME | End: 2017-07-14
Attending: OBSTETRICS & GYNECOLOGY | Admitting: OBSTETRICS & GYNECOLOGY

## 2017-07-14 VITALS
HEIGHT: 67 IN | HEART RATE: 71 BPM | BODY MASS INDEX: 28.91 KG/M2 | SYSTOLIC BLOOD PRESSURE: 112 MMHG | WEIGHT: 184.2 LBS | DIASTOLIC BLOOD PRESSURE: 56 MMHG | OXYGEN SATURATION: 94 %

## 2017-07-14 DIAGNOSIS — R80.9 MICROALBUMINURIA: ICD-10-CM

## 2017-07-14 DIAGNOSIS — I25.5 ISCHEMIC CARDIOMYOPATHY: ICD-10-CM

## 2017-07-14 DIAGNOSIS — E08.319 DIABETIC RETINOPATHY ASSOCIATED WITH DIABETES MELLITUS DUE TO UNDERLYING CONDITION, MACULAR EDEMA PRESENCE UNSPECIFIED, UNSPECIFIED RETINOPATHY SEVERITY: ICD-10-CM

## 2017-07-14 DIAGNOSIS — I10 ESSENTIAL HYPERTENSION: ICD-10-CM

## 2017-07-14 DIAGNOSIS — E78.5 HYPERLIPIDEMIA, UNSPECIFIED HYPERLIPIDEMIA TYPE: ICD-10-CM

## 2017-07-14 DIAGNOSIS — Z12.4 SCREENING FOR MALIGNANT NEOPLASM OF CERVIX: ICD-10-CM

## 2017-07-14 DIAGNOSIS — I25.10 CORONARY ARTERY DISEASE INVOLVING NATIVE CORONARY ARTERY OF NATIVE HEART WITHOUT ANGINA PECTORIS: ICD-10-CM

## 2017-07-14 DIAGNOSIS — Z79.4 TYPE 2 DIABETES MELLITUS WITH CHRONIC KIDNEY DISEASE, WITH LONG-TERM CURRENT USE OF INSULIN, UNSPECIFIED CKD STAGE (HCC): Primary | ICD-10-CM

## 2017-07-14 DIAGNOSIS — E11.22 TYPE 2 DIABETES MELLITUS WITH CHRONIC KIDNEY DISEASE, WITH LONG-TERM CURRENT USE OF INSULIN, UNSPECIFIED CKD STAGE (HCC): Primary | ICD-10-CM

## 2017-07-14 PROBLEM — E11.29 TYPE 2 DIABETES MELLITUS WITH MICROALBUMINURIA (HCC): Status: ACTIVE | Noted: 2017-02-20

## 2017-07-14 PROCEDURE — 99214 OFFICE O/P EST MOD 30 MIN: CPT | Performed by: INTERNAL MEDICINE

## 2017-07-14 PROCEDURE — G0202 SCR MAMMO BI INCL CAD: HCPCS

## 2017-07-14 RX ORDER — LOSARTAN POTASSIUM 25 MG/1
25 TABLET ORAL
COMMUNITY
End: 2019-04-19

## 2017-07-14 NOTE — PROGRESS NOTES
Subjective   Noemí Pedraza is a 56 y.o. female.     HPI Comments: F/u for dm 2,hyperlipidemia / testing bs 1-2 x day / last dm eye exam 6/20/17 with dr Rhodes / last dm foot exam 2/20/17 with dr Bowman     Diabetes     Hyperlipidemia         Patient is a 56-year-old female came in for follow-up  Patient has known diabetes mellitus since 1997 and started on insulin in 2009. She is on Toujeo 35 units every AM  and Humalog 6-8 's with each meal She checks her blood sugar 1 time per day. Fasting blood sugar runs between . She is compliant with her diet. She has occasional hypoglycemia in the early morning. She has gained 10 pounds since February 2017.  Her last meal was at 1 PM.      Her last eye examination was 6/17. She had previous retinal laser treatment.  She has early cataracts.  She has microalbuminuria on urine sample taken last 2/17. She is on losartan 25 mg once a day.   She complains of occasional tingling in her feet but no pain.      She has hyperlipidemia and is on Livalo 2 mg 1 tablets every evening.  She denies any myalgia.  She stopped pravastatin since December 2016 because of leg shaking at night. Lipitor caused muscle weakness in the past. She has not used Crestor or Zocor before.      She has history of coronary disease and ischemic cardiomyopathy and has a defibrillator in place. She had a myocardial infarction in 2009. She denies any chest pain, orthopnea, or paroxysmal nocturnal dyspnea. She has not had any defibrillator discharge. She was started on amiodarone in January 2016. She was admitted at [facility] in January 2017 for congestive heart failure. She is on isosorbide dinitrate 20 mg 3 times a day, hydralazine 50 mg 3 times a day, Bumex 2 mg TID, and amiodarone 200 mg once a day. She follows with Dr. Woodard.     She has no previous history of thyroid disease. Thyroid function test done in September 2016 showed a normal free T4 at 1.29 ng per DL and a mildly elevated TSH of 4.480  "with an upper limit of normal of 4.2.    The following portions of the patient's history were reviewed and updated as appropriate: allergies, current medications, past family history, past medical history, past social history, past surgical history and problem list.    Review of Systems   Constitutional: Negative.    HENT: Negative.    Eyes: Positive for visual disturbance ( cataracts ).   Respiratory: Negative.    Cardiovascular: Negative.    Gastrointestinal: Negative.    Endocrine: Negative.    Genitourinary: Negative.    Musculoskeletal: Positive for back pain (arthritis / bulging disc ).   Skin: Negative.    Allergic/Immunologic: Negative.    Neurological: Negative.    Hematological: Negative.    Psychiatric/Behavioral: Positive for sleep disturbance ( sleep apnea on c pap machine ).       Objective      Vitals:    07/14/17 1421   BP: 112/56   BP Location: Right arm   Patient Position: Sitting   Cuff Size: Large Adult   Pulse: 71   SpO2: 94%   Weight: 184 lb 3.2 oz (83.6 kg)   Height: 67\" (170.2 cm)     Physical Exam   Constitutional: She is oriented to person, place, and time. She appears well-developed and well-nourished. No distress.   HENT:   Head: Normocephalic.   Nose: Nose normal.   Mouth/Throat: No oropharyngeal exudate.   Eyes: Conjunctivae and EOM are normal. Right eye exhibits no discharge. Left eye exhibits no discharge. No scleral icterus.   Neck: Neck supple. No JVD present. No tracheal deviation present. No thyromegaly present.   Cardiovascular: Normal rate, regular rhythm, normal heart sounds and intact distal pulses.  Exam reveals no friction rub.    No murmur heard.  Pulmonary/Chest: Effort normal and breath sounds normal. No respiratory distress. She has no wheezes. She has no rales.   Abdominal: Soft. Bowel sounds are normal. She exhibits no distension and no mass. There is no tenderness.   Musculoskeletal: Normal range of motion. She exhibits no edema or deformity.   Lymphadenopathy:     " She has no cervical adenopathy.   Neurological: She is alert and oriented to person, place, and time. She displays normal reflexes. No cranial nerve deficit. Coordination normal.   Skin: Skin is warm. No rash noted. No erythema. No pallor.   Psychiatric: She has a normal mood and affect. Her behavior is normal.     Office Visit on 07/10/2017   Component Date Value Ref Range Status   • Color 07/10/2017 Yellow  Yellow, Straw, Dark Yellow, Nelly Final   • Clarity, UA 07/10/2017 Clear  Clear Final   • Glucose, UA 07/10/2017 Negative  Negative, 1000 mg/dL (3+) mg/dL Final   • Bilirubin 07/10/2017 Negative  Negative Final   • Ketones, UA 07/10/2017 Negative  Negative Final   • Specific Gravity  07/10/2017 1.020  1.005 - 1.030 Final   • Blood, UA 07/10/2017 Trace* Negative Final   • pH, Urine 07/10/2017 5.0  5.0 - 8.0 Final   • Protein, POC 07/10/2017 Trace* Negative mg/dL Final   • Urobilinogen, UA 07/10/2017 Normal  Normal Final   • Leukocytes 07/10/2017 Negative  Negative Final   • Nitrite, UA 07/10/2017 Negative  Negative Final   • Diagnosis 07/10/2017 Comment   Final    NEGATIVE FOR INTRAEPITHELIAL LESION AND MALIGNANCY.   • Specimen adequacy: 07/10/2017 Comment   Final    Satisfactory for evaluation. No endocervical component is identified.   • Clinician Provided ICD-10: 07/10/2017 Comment   Final    Z12.4   • Performed by: 07/10/2017 Comment   Final    Brionna Pitts Cytotechnologist   • . 07/10/2017 .   Final   • Note: 07/10/2017 Comment   Final    Comment: The Pap smear is a screening test designed to aid in the detection of  premalignant and malignant conditions of the uterine cervix.  It is not a  diagnostic procedure and should not be used as the sole means of detecting  cervical cancer.  Both false-positive and false-negative reports do occur.     • Method: 07/10/2017 Comment   Final    Comment: This liquid based ThinPrep(R) pap test was screened with the  use of an image guided system.     • HPV Aptima  07/10/2017 Negative  Negative Final    Comment: This test detects fourteen high-risk HPV types (16/18/31/33/35/39/45/  51/52/56/58/59/66/68) without differentiation.       Assessment/Plan   Noemí was seen today for diabetes and hyperlipidemia.    Diagnoses and all orders for this visit:    Type 2 diabetes mellitus with chronic kidney disease, with long-term current use of insulin, unspecified CKD stage  -     Comprehensive Metabolic Panel  -     Hemoglobin A1c  -     Microalbumin / Creatinine Urine Ratio    Coronary artery disease involving native coronary artery of native heart without angina pectoris    Ischemic cardiomyopathy    Hyperlipidemia, unspecified hyperlipidemia type  -     Lipid Panel    Essential hypertension    Diabetic retinopathy associated with diabetes mellitus due to underlying condition, macular edema presence unspecified, unspecified retinopathy severity    Microalbuminuria      Continue Toujeo and Humalog.  Check hemoglobin A1c and urine microalbumin.  Continue isosorbide, hydralazine, Bumex, losartan, and amiodarone.  Continue Livalo 2 mg 1 tablet once a day.  Check lipid profile.    Send copy of my notes and labs to Dr. Woodard and Dr. Foote    RTC 4 mos.

## 2017-07-15 LAB
ALBUMIN SERPL-MCNC: 4.3 G/DL (ref 3.5–5.2)
ALBUMIN/CREAT UR: 78.3 MG/G CREAT (ref 0–30)
ALBUMIN/GLOB SERPL: 1.2 G/DL
ALP SERPL-CCNC: 155 U/L (ref 39–117)
ALT SERPL-CCNC: 27 U/L (ref 1–33)
AST SERPL-CCNC: 29 U/L (ref 1–32)
BILIRUB SERPL-MCNC: 0.2 MG/DL (ref 0.1–1.2)
BUN SERPL-MCNC: 49 MG/DL (ref 6–20)
BUN/CREAT SERPL: 20.4 (ref 7–25)
CALCIUM SERPL-MCNC: 10.4 MG/DL (ref 8.6–10.5)
CHLORIDE SERPL-SCNC: 95 MMOL/L (ref 98–107)
CHOLEST SERPL-MCNC: 280 MG/DL (ref 0–200)
CO2 SERPL-SCNC: 33.5 MMOL/L (ref 22–29)
CREAT SERPL-MCNC: 2.4 MG/DL (ref 0.57–1)
CREAT UR-MCNC: 87 MG/DL
GLOBULIN SER CALC-MCNC: 3.7 GM/DL
GLUCOSE SERPL-MCNC: 96 MG/DL (ref 65–99)
HBA1C MFR BLD: 8.8 % (ref 4.8–5.6)
HDLC SERPL-MCNC: 79 MG/DL (ref 40–60)
LDLC SERPL CALC-MCNC: 178 MG/DL (ref 0–100)
MICROALBUMIN UR-MCNC: 68.1 UG/ML
POTASSIUM SERPL-SCNC: 4.2 MMOL/L (ref 3.5–5.2)
PROT SERPL-MCNC: 8 G/DL (ref 6–8.5)
SODIUM SERPL-SCNC: 142 MMOL/L (ref 136–145)
TRIGL SERPL-MCNC: 113 MG/DL (ref 0–150)
VLDLC SERPL CALC-MCNC: 22.6 MG/DL (ref 5–40)

## 2017-07-18 ENCOUNTER — TELEPHONE (OUTPATIENT)
Dept: OBSTETRICS AND GYNECOLOGY | Facility: CLINIC | Age: 57
End: 2017-07-18

## 2017-07-18 NOTE — TELEPHONE ENCOUNTER
----- Message from Curt Dixon MD sent at 7/17/2017 10:01 AM EDT -----  Call patient: Normal mammogram

## 2017-07-19 ENCOUNTER — TELEPHONE (OUTPATIENT)
Dept: OBSTETRICS AND GYNECOLOGY | Facility: CLINIC | Age: 57
End: 2017-07-19

## 2017-07-26 VITALS — HEIGHT: 67 IN | WEIGHT: 180 LBS | BODY MASS INDEX: 28.25 KG/M2

## 2017-08-07 DIAGNOSIS — Z12.11 ENCOUNTER FOR SCREENING COLONOSCOPY: Primary | ICD-10-CM

## 2017-10-31 RX ORDER — INSULIN LISPRO 100 [IU]/ML
INJECTION, SOLUTION INTRAVENOUS; SUBCUTANEOUS
Qty: 27 ML | Refills: 1 | Status: SHIPPED | OUTPATIENT
Start: 2017-10-31 | End: 2018-11-07 | Stop reason: SDUPTHER

## 2017-11-17 ENCOUNTER — OFFICE VISIT (OUTPATIENT)
Dept: ENDOCRINOLOGY | Age: 57
End: 2017-11-17

## 2017-11-17 VITALS
BODY MASS INDEX: 31.42 KG/M2 | DIASTOLIC BLOOD PRESSURE: 80 MMHG | WEIGHT: 200.2 LBS | HEART RATE: 72 BPM | SYSTOLIC BLOOD PRESSURE: 122 MMHG | HEIGHT: 67 IN | OXYGEN SATURATION: 96 %

## 2017-11-17 DIAGNOSIS — I25.5 ISCHEMIC CARDIOMYOPATHY: ICD-10-CM

## 2017-11-17 DIAGNOSIS — Z79.4 TYPE 2 DIABETES MELLITUS WITH CHRONIC KIDNEY DISEASE, WITH LONG-TERM CURRENT USE OF INSULIN, UNSPECIFIED CKD STAGE (HCC): Primary | ICD-10-CM

## 2017-11-17 DIAGNOSIS — E11.22 TYPE 2 DIABETES MELLITUS WITH CHRONIC KIDNEY DISEASE, WITH LONG-TERM CURRENT USE OF INSULIN, UNSPECIFIED CKD STAGE (HCC): Primary | ICD-10-CM

## 2017-11-17 DIAGNOSIS — R80.9 MICROALBUMINURIA: ICD-10-CM

## 2017-11-17 DIAGNOSIS — E11.319 DIABETIC RETINOPATHY OF BOTH EYES ASSOCIATED WITH TYPE 2 DIABETES MELLITUS, MACULAR EDEMA PRESENCE UNSPECIFIED, UNSPECIFIED RETINOPATHY SEVERITY (HCC): ICD-10-CM

## 2017-11-17 DIAGNOSIS — G47.33 OBSTRUCTIVE SLEEP APNEA ON CPAP: ICD-10-CM

## 2017-11-17 DIAGNOSIS — Z99.89 OBSTRUCTIVE SLEEP APNEA ON CPAP: ICD-10-CM

## 2017-11-17 DIAGNOSIS — I25.10 CORONARY ARTERY DISEASE INVOLVING NATIVE CORONARY ARTERY OF NATIVE HEART WITHOUT ANGINA PECTORIS: ICD-10-CM

## 2017-11-17 DIAGNOSIS — E78.5 HYPERLIPIDEMIA, UNSPECIFIED HYPERLIPIDEMIA TYPE: ICD-10-CM

## 2017-11-17 DIAGNOSIS — I10 ESSENTIAL HYPERTENSION: ICD-10-CM

## 2017-11-17 PROCEDURE — 99214 OFFICE O/P EST MOD 30 MIN: CPT | Performed by: INTERNAL MEDICINE
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
## 2017-11-18 LAB
ALBUMIN SERPL-MCNC: 4.3 G/DL (ref 3.5–5.2)
ALBUMIN/GLOB SERPL: 1.1 G/DL
ALP SERPL-CCNC: 182 U/L (ref 39–117)
ALT SERPL-CCNC: 29 U/L (ref 1–33)
AST SERPL-CCNC: 28 U/L (ref 1–32)
BILIRUB SERPL-MCNC: 0.2 MG/DL (ref 0.1–1.2)
BUN SERPL-MCNC: 44 MG/DL (ref 6–20)
BUN/CREAT SERPL: 27.7 (ref 7–25)
CALCIUM SERPL-MCNC: 10.1 MG/DL (ref 8.6–10.5)
CHLORIDE SERPL-SCNC: 97 MMOL/L (ref 98–107)
CHOLEST SERPL-MCNC: 244 MG/DL (ref 0–200)
CO2 SERPL-SCNC: 33.8 MMOL/L (ref 22–29)
CREAT SERPL-MCNC: 1.59 MG/DL (ref 0.57–1)
GFR SERPLBLD CREATININE-BSD FMLA CKD-EPI: 33 ML/MIN/1.73
GFR SERPLBLD CREATININE-BSD FMLA CKD-EPI: 40 ML/MIN/1.73
GLOBULIN SER CALC-MCNC: 3.8 GM/DL
GLUCOSE SERPL-MCNC: 157 MG/DL (ref 65–99)
HBA1C MFR BLD: 7.47 % (ref 4.8–5.6)
HDLC SERPL-MCNC: 73 MG/DL (ref 40–60)
LDLC SERPL CALC-MCNC: 154 MG/DL (ref 0–100)
POTASSIUM SERPL-SCNC: 4.4 MMOL/L (ref 3.5–5.2)
PROT SERPL-MCNC: 8.1 G/DL (ref 6–8.5)
SODIUM SERPL-SCNC: 142 MMOL/L (ref 136–145)
T4 FREE SERPL-MCNC: 1.07 NG/DL (ref 0.93–1.7)
TRIGL SERPL-MCNC: 87 MG/DL (ref 0–150)
TSH SERPL DL<=0.005 MIU/L-ACNC: 2.28 MIU/ML (ref 0.27–4.2)
VLDLC SERPL CALC-MCNC: 17.4 MG/DL (ref 5–40)

## 2017-11-20 DIAGNOSIS — E78.5 HYPERLIPIDEMIA, UNSPECIFIED HYPERLIPIDEMIA TYPE: ICD-10-CM

## 2018-01-04 DIAGNOSIS — Z79.4 TYPE 2 DIABETES MELLITUS WITH COMPLICATION, WITH LONG-TERM CURRENT USE OF INSULIN (HCC): ICD-10-CM

## 2018-01-04 DIAGNOSIS — E11.8 TYPE 2 DIABETES MELLITUS WITH COMPLICATION, WITH LONG-TERM CURRENT USE OF INSULIN (HCC): ICD-10-CM

## 2018-07-17 ENCOUNTER — TELEPHONE (OUTPATIENT)
Dept: ENDOCRINOLOGY | Age: 58
End: 2018-07-17

## 2018-07-17 RX ORDER — BLOOD-GLUCOSE METER
KIT MISCELLANEOUS
Qty: 400 EACH | Refills: 1 | Status: SHIPPED | OUTPATIENT
Start: 2018-07-17 | End: 2019-02-11 | Stop reason: CLARIF

## 2018-07-17 NOTE — TELEPHONE ENCOUNTER
----- Message from Celia Jiang MA sent at 7/17/2018  2:39 PM EDT -----  Contact: PATIENT   THESE STRIPS ARE NOT COVERED BY INSURANCE. CAN YOU PLEASE CHANGE.   ----- Message -----  From: Albertina Gabriel  Sent: 7/17/2018   2:25 PM  To: Celia Jiang MA    PIROR AUTHORIZATION REQUEST     MEDICATION:  glucose blood test strip     MESSAGE: PATIENT HAS CALLED IN REGARDS TO GETTING A PRIOR AUTHORIZATION FOR THE ABOVE MEDICATION. PATIENT IS REQUESTING A CALL BACK IN REGARDS TO WHEN IT WILL GET APPROVED.     PATIENT IS CURRENTLY OUT OF TEST STRIP AND WILL NEED SOME AS SOON AS POSSIBLE.    CALL BACK NUMBER :  [phone]

## 2018-07-17 NOTE — TELEPHONE ENCOUNTER
----- Message from Albertina Gabriel sent at 7/17/2018  4:07 PM EDT -----  Contact: PATIENT   PATIENT   REQUEST  TO SEND IN A PRESCRIPTION :    MEDICATION: ANY ONE TOUCH BRAND FOR TEST STRIPS       MESSAGE:  PATIENT HAS CALLED BACK IN REGARDS TO GETTING THE ABOVE MEDICATION CALL IN.     PATIENT CALLED HER INSURANCE COMPANY ANY THEY WILL COVER ANY ONE TOUCH BRAND FOR TEST STRIP AND A METER  PLEASE SENT IT TO THE FOLLOWING PHARMACY  [address] - [address] [facility] [phone] [facility] [phone]     PHONE NUMBER: [phone]

## 2018-08-01 RX ORDER — INSULIN GLARGINE 100 [IU]/ML
INJECTION, SOLUTION SUBCUTANEOUS
Qty: 12 ML | Refills: 0 | Status: SHIPPED | OUTPATIENT
Start: 2018-08-01 | End: 2018-11-07 | Stop reason: SDUPTHER

## 2018-08-07 ENCOUNTER — TELEPHONE (OUTPATIENT)
Dept: ENDOCRINOLOGY | Age: 58
End: 2018-08-07

## 2018-08-07 NOTE — TELEPHONE ENCOUNTER
----- Message from Patricia Larson sent at 8/7/2018  9:01 AM EDT -----  Contact: PATIENT   PATIENT STATED   THAT THE INSURANCE AUTHORIZED HER TO HAVE THE ONE TOUCH ULTRA MINI AND THE TEST STRIPS.     PLEASE SEND SCRIPT FOR THE ABOVE ITEMS TO     [facility] [address] [place] [address] ([place], [place] - [address] [facility] - [phone]  - [phone] (Phone)  [phone] (Fax)    PATIENT TEST 4 TIMES A DAY AND IF YOU HAVE ANY QUESTION YOU CAN REACH PATIENT -[phone]

## 2018-11-07 ENCOUNTER — OFFICE VISIT (OUTPATIENT)
Dept: INTERNAL MEDICINE | Facility: CLINIC | Age: 58
End: 2018-11-07

## 2018-11-07 VITALS
OXYGEN SATURATION: 100 % | DIASTOLIC BLOOD PRESSURE: 81 MMHG | RESPIRATION RATE: 17 BRPM | BODY MASS INDEX: 33.27 KG/M2 | TEMPERATURE: 97.1 F | HEART RATE: 60 BPM | SYSTOLIC BLOOD PRESSURE: 144 MMHG | HEIGHT: 67 IN | WEIGHT: 212 LBS

## 2018-11-07 DIAGNOSIS — R80.9 MICROALBUMINURIA: ICD-10-CM

## 2018-11-07 DIAGNOSIS — E78.2 MIXED HYPERLIPIDEMIA: Primary | ICD-10-CM

## 2018-11-07 DIAGNOSIS — I25.10 CORONARY ARTERY DISEASE INVOLVING NATIVE CORONARY ARTERY OF NATIVE HEART WITHOUT ANGINA PECTORIS: ICD-10-CM

## 2018-11-07 DIAGNOSIS — N18.30 TYPE 2 DIABETES MELLITUS WITH STAGE 3 CHRONIC KIDNEY DISEASE, WITH LONG-TERM CURRENT USE OF INSULIN (HCC): ICD-10-CM

## 2018-11-07 DIAGNOSIS — E11.22 TYPE 2 DIABETES MELLITUS WITH STAGE 3 CHRONIC KIDNEY DISEASE, WITH LONG-TERM CURRENT USE OF INSULIN (HCC): ICD-10-CM

## 2018-11-07 DIAGNOSIS — Z79.4 TYPE 2 DIABETES MELLITUS WITH STAGE 3 CHRONIC KIDNEY DISEASE, WITH LONG-TERM CURRENT USE OF INSULIN (HCC): ICD-10-CM

## 2018-11-07 DIAGNOSIS — I10 ESSENTIAL HYPERTENSION: ICD-10-CM

## 2018-11-07 PROCEDURE — 99214 OFFICE O/P EST MOD 30 MIN: CPT | Performed by: INTERNAL MEDICINE

## 2018-11-07 RX ORDER — INSULIN LISPRO 100 [IU]/ML
INJECTION, SOLUTION INTRAVENOUS; SUBCUTANEOUS
Qty: 5 PEN | Refills: 1 | Status: SHIPPED | OUTPATIENT
Start: 2018-11-07 | End: 2018-11-29

## 2018-11-08 LAB
ALBUMIN SERPL-MCNC: 4.6 G/DL (ref 3.5–5.2)
ALBUMIN/GLOB SERPL: 1.3 G/DL
ALP SERPL-CCNC: 123 U/L (ref 39–117)
ALT SERPL-CCNC: 21 U/L (ref 1–33)
AST SERPL-CCNC: 18 U/L (ref 1–32)
BASOPHILS # BLD AUTO: 0.01 10*3/MM3 (ref 0–0.2)
BASOPHILS NFR BLD AUTO: 0.2 % (ref 0–1.5)
BILIRUB SERPL-MCNC: 0.2 MG/DL (ref 0.1–1.2)
BUN SERPL-MCNC: 34 MG/DL (ref 6–20)
BUN/CREAT SERPL: 16.7 (ref 7–25)
CALCIUM SERPL-MCNC: 10.6 MG/DL (ref 8.6–10.5)
CHLORIDE SERPL-SCNC: 89 MMOL/L (ref 98–107)
CO2 SERPL-SCNC: 31.7 MMOL/L (ref 22–29)
CREAT SERPL-MCNC: 2.04 MG/DL (ref 0.57–1)
EOSINOPHIL # BLD AUTO: 0.35 10*3/MM3 (ref 0–0.7)
EOSINOPHIL NFR BLD AUTO: 5.6 % (ref 0.3–6.2)
ERYTHROCYTE [DISTWIDTH] IN BLOOD BY AUTOMATED COUNT: 14.6 % (ref 11.7–13)
GLOBULIN SER CALC-MCNC: 3.5 GM/DL
GLUCOSE SERPL-MCNC: 200 MG/DL (ref 65–99)
HBA1C MFR BLD: 10.1 % (ref 4.8–5.6)
HCT VFR BLD AUTO: 36.7 % (ref 35.6–45.5)
HGB BLD-MCNC: 11.6 G/DL (ref 11.9–15.5)
IMM GRANULOCYTES # BLD: 0.01 10*3/MM3 (ref 0–0.03)
IMM GRANULOCYTES NFR BLD: 0.2 % (ref 0–0.5)
LYMPHOCYTES # BLD AUTO: 1.27 10*3/MM3 (ref 0.9–4.8)
LYMPHOCYTES NFR BLD AUTO: 20.5 % (ref 19.6–45.3)
MCH RBC QN AUTO: 26.5 PG (ref 26.9–32)
MCHC RBC AUTO-ENTMCNC: 31.6 G/DL (ref 32.4–36.3)
MCV RBC AUTO: 84 FL (ref 80.5–98.2)
MONOCYTES # BLD AUTO: 0.9 10*3/MM3 (ref 0.2–1.2)
MONOCYTES NFR BLD AUTO: 14.5 % (ref 5–12)
NEUTROPHILS # BLD AUTO: 3.67 10*3/MM3 (ref 1.9–8.1)
NEUTROPHILS NFR BLD AUTO: 59.2 % (ref 42.7–76)
PLATELET # BLD AUTO: 200 10*3/MM3 (ref 140–500)
POTASSIUM SERPL-SCNC: 3.9 MMOL/L (ref 3.5–5.2)
PROT SERPL-MCNC: 8.1 G/DL (ref 6–8.5)
RBC # BLD AUTO: 4.37 10*6/MM3 (ref 3.9–5.2)
SODIUM SERPL-SCNC: 136 MMOL/L (ref 136–145)
T4 FREE SERPL-MCNC: 1.24 NG/DL (ref 0.93–1.7)
TSH SERPL DL<=0.005 MIU/L-ACNC: 2.1 MIU/ML (ref 0.27–4.2)
UNABLE TO VOID: NORMAL
WBC # BLD AUTO: 6.2 10*3/MM3 (ref 4.5–10.7)

## 2018-11-20 NOTE — PROGRESS NOTES
Subjective   Noemí Pedraza is a 57 y.o. female.   She is here today for mixed hyperlipidemia hypertension CAD type 2 diabetes and microalbuminuria  History of Present Illness   She is here today for mixed hyperlipidemia which is stable on current medication hypertension which is well-controlled on current medication CAD which is stable with no chest pain type 2 diabetes which is not well-controlled yet and microalbuminuria which is not well-controlled.  The following portions of the patient's history were reviewed and updated as appropriate: allergies, current medications, past family history, past medical history, past social history, past surgical history and problem list.    Review of Systems   Constitutional: Negative for fatigue.   Endocrine: Negative for polydipsia and polyuria.   Neurological: Negative for weakness.   Psychiatric/Behavioral: Negative for dysphoric mood. The patient is not nervous/anxious.    All other systems reviewed and are negative.      Objective   Physical Exam   Constitutional: She is oriented to person, place, and time. She appears well-developed and well-nourished. She is cooperative.   HENT:   Head: Normocephalic and atraumatic.   Right Ear: Hearing, tympanic membrane, external ear and ear canal normal.   Left Ear: Hearing, tympanic membrane, external ear and ear canal normal.   Nose: Nose normal.   Mouth/Throat: Uvula is midline, oropharynx is clear and moist and mucous membranes are normal.   Eyes: Conjunctivae, EOM and lids are normal. Pupils are equal, round, and reactive to light.   Neck: Phonation normal. Neck supple. Carotid bruit is not present.   Cardiovascular: Normal rate, regular rhythm and normal heart sounds. Exam reveals no gallop and no friction rub.   No murmur heard.  Pulmonary/Chest: Effort normal and breath sounds normal. No respiratory distress.   Abdominal: Soft. Bowel sounds are normal. She exhibits no distension and no mass. There is no hepatosplenomegaly.  There is no tenderness. There is no rebound and no guarding. No hernia.   Musculoskeletal: She exhibits no edema.   Neurological: She is alert and oriented to person, place, and time. Coordination and gait normal.   Skin: Skin is warm and dry.   Psychiatric: She has a normal mood and affect. Her speech is normal and behavior is normal. Judgment and thought content normal.   Nursing note and vitals reviewed.      Assessment/Plan   Diagnoses and all orders for this visit:    Mixed hyperlipidemia  -     T4, Free  -     TSH    Essential hypertension  -     T4, Free  -     TSH  -     Urinalysis With Microscopic - Urine, Clean Catch    Coronary artery disease involving native coronary artery of native heart without angina pectoris    Type 2 diabetes mellitus with stage 3 chronic kidney disease, with long-term current use of insulin (CMS/[facility])  -     CBC & Differential  -     Comprehensive Metabolic Panel  -     Hemoglobin A1c  -     MicroAlbumin, Urine, Random - Urine, Clean Catch    Microalbuminuria    Other orders  -     HUMALOG KWIKPEN 100 UNIT/ML solution pen-injector; INJECT 8-12 units with meals   Average 24 units daily  -     Insulin Glargine (LANTUS SOLOSTAR) 100 UNIT/ML injection pen; Inject 35 Units under the skin into the appropriate area as directed Daily.  -     Unable To Void      Mixed hyperlipidemia has been stable on current medication we will check lipid panel when she is fasting  Hypertension well-controlled on current medication no changes  CAD stable on current medication with no chest pain  Type 2 diabetes with stage III chronic kidney disease not a stable as we would like and we will check labs today  Microalbuminuria continue with ACE inhibitor or arm to help control this but we will check that today as well

## 2018-11-29 ENCOUNTER — OFFICE VISIT (OUTPATIENT)
Dept: ENDOCRINOLOGY | Age: 58
End: 2018-11-29

## 2018-11-29 VITALS
WEIGHT: 216.8 LBS | DIASTOLIC BLOOD PRESSURE: 80 MMHG | OXYGEN SATURATION: 97 % | HEIGHT: 67 IN | SYSTOLIC BLOOD PRESSURE: 152 MMHG | HEART RATE: 59 BPM | BODY MASS INDEX: 34.03 KG/M2

## 2018-11-29 DIAGNOSIS — Z79.4 TYPE 2 DIABETES MELLITUS WITH STAGE 3 CHRONIC KIDNEY DISEASE, WITH LONG-TERM CURRENT USE OF INSULIN (HCC): Primary | ICD-10-CM

## 2018-11-29 DIAGNOSIS — N18.30 TYPE 2 DIABETES MELLITUS WITH STAGE 3 CHRONIC KIDNEY DISEASE, WITH LONG-TERM CURRENT USE OF INSULIN (HCC): Primary | ICD-10-CM

## 2018-11-29 DIAGNOSIS — I25.10 ARTERIOSCLEROSIS OF CORONARY ARTERY: ICD-10-CM

## 2018-11-29 DIAGNOSIS — G47.33 OBSTRUCTIVE SLEEP APNEA ON CPAP: ICD-10-CM

## 2018-11-29 DIAGNOSIS — R80.9 MICROALBUMINURIA: ICD-10-CM

## 2018-11-29 DIAGNOSIS — I10 ESSENTIAL HYPERTENSION: ICD-10-CM

## 2018-11-29 DIAGNOSIS — E11.22 TYPE 2 DIABETES MELLITUS WITH STAGE 3 CHRONIC KIDNEY DISEASE, WITH LONG-TERM CURRENT USE OF INSULIN (HCC): Primary | ICD-10-CM

## 2018-11-29 DIAGNOSIS — Z99.89 OBSTRUCTIVE SLEEP APNEA ON CPAP: ICD-10-CM

## 2018-11-29 DIAGNOSIS — I25.10 CORONARY ARTERY DISEASE INVOLVING NATIVE CORONARY ARTERY OF NATIVE HEART WITHOUT ANGINA PECTORIS: ICD-10-CM

## 2018-11-29 DIAGNOSIS — E78.5 HYPERLIPIDEMIA, UNSPECIFIED HYPERLIPIDEMIA TYPE: ICD-10-CM

## 2018-11-29 PROCEDURE — 99214 OFFICE O/P EST MOD 30 MIN: CPT | Performed by: INTERNAL MEDICINE

## 2018-11-29 RX ORDER — ROSUVASTATIN CALCIUM 5 MG/1
5 TABLET, COATED ORAL DAILY
COMMUNITY
End: 2019-04-22 | Stop reason: SDUPTHER

## 2018-11-29 RX ORDER — INSULIN LISPRO 100 [IU]/ML
INJECTION, SOLUTION INTRAVENOUS; SUBCUTANEOUS
Qty: 15 PEN | Refills: 2 | Status: SHIPPED | OUTPATIENT
Start: 2018-11-29 | End: 2018-12-20

## 2018-11-29 NOTE — PROGRESS NOTES
Subjective   Noemí Pedraza is a 57 y.o. female.     F/u for dm 2, hyperlipidemia/ testing bs 1-2 x day / last dm eye exam 6/28/18 with dr Rhodes / last dm foot exam today with dr Bowman / flu vaccine  Allergic too       Diabetes   She presents for her follow-up diabetic visit. She has type 2 diabetes mellitus.   Hyperlipidemia         Patient is a 57-year-old female who has been lost to follow-up since 11/17.    Patient has known diabetes mellitus since 1997 and started on insulin in 2009. She is on Lantus 35 units every AM  and Humalog 12 units with each meal She checks her blood sugar 2-3 times per day. Fasting blood sugar runs between 163-405.  Lunchtime blood sugar runs between 188-405.  Suppertime blood sugar runs between 147-294.. She has been non-compliant with her diet. She has denies severe hypoglycemia. She has gained 16 pounds since 11/17.  Hemoglobin A1c done this month was 10.1%. Her last meal was at 12 PM.      Her last eye examination was 6/18. She had previous hemorrhage on right eye and had Avastin injection.  She had previous retinal laser treatment .  She has early cataracts.  She has microalbuminuria on urine sample taken last 7/17. She is on losartan 25 mg once a day.   She has occasional tingling in her feet but no pain.      She has hyperlipidemia and is on Crestor 5 mg/day  She denies any myalgia.  Livalo was discontinued because her insurance will not pay for it.  She stopped pravastatin since December 2016 because of leg shaking at night. Lipitor caused muscle weakness in the past. She has not used Zocor before.      She has history of coronary disease and ischemic cardiomyopathy and has a defibrillator in place. She had a myocardial infarction in 2009. She denies any chest pain, orthopnea, or paroxysmal nocturnal dyspnea. She has not had any defibrillator discharge.  Amiodarone was discontinued in 7/17. She was admitted at [facility] in January 2017 for congestive heart failure. She is  "on isosorbide dinitrate 20 mg 3 times a day, hydralazine 50 mg 3 times a day, and Bumex 2 mg BID. She follows with Dr. Woodard.      She has no previous history of thyroid disease. Thyroid function test done in November 2018 showed normal TSH at 2.1 and normal free T4 at 1.24 ng per DL.      The following portions of the patient's history were reviewed and updated as appropriate: allergies, current medications, past family history, past medical history, past social history, past surgical history and problem list.    Review of Systems   Constitutional: Negative.    HENT: Negative.    Eyes: Negative.    Respiratory: Negative.    Cardiovascular: Negative.    Gastrointestinal: Negative.    Endocrine: Negative.    Genitourinary: Negative.    Musculoskeletal: Negative.    Skin: Negative.    Allergic/Immunologic: Negative.    Neurological: Negative.    Hematological: Negative.    Psychiatric/Behavioral: Positive for sleep disturbance (sleep apnea using CPAP machine ).       Objective      Vitals:    11/29/18 1454   BP: 152/80   BP Location: Right arm   Patient Position: Sitting   Cuff Size: Large Adult   Pulse: 59   SpO2: 97%   Weight: 98.3 kg (216 lb 12.8 oz)   Height: 170.2 cm (67.01\")     Physical Exam   Constitutional: She is oriented to person, place, and time. She appears well-developed and well-nourished. No distress.   HENT:   Head: Normocephalic.   Right Ear: External ear normal.   Left Ear: External ear normal.   Nose: Nose normal.   Mouth/Throat: Oropharynx is clear and moist. No oropharyngeal exudate.   Eyes: Conjunctivae and EOM are normal. Right eye exhibits no discharge. Left eye exhibits no discharge. No scleral icterus.   Neck: Normal range of motion. Neck supple. No JVD present. No tracheal deviation present. No thyromegaly present.   Cardiovascular: Normal rate, regular rhythm, normal heart sounds and intact distal pulses. Exam reveals no gallop and no friction rub.   No murmur heard.  Pulmonary/Chest: " Effort normal and breath sounds normal. No stridor. No respiratory distress. She has no wheezes. She has no rales. She exhibits no tenderness.   Abdominal: Soft. Bowel sounds are normal. She exhibits no distension and no mass. There is no tenderness. There is no guarding.   Musculoskeletal: Normal range of motion. She exhibits no edema, tenderness or deformity.   Lymphadenopathy:     She has no cervical adenopathy.   Neurological: She is alert and oriented to person, place, and time. She displays normal reflexes. No cranial nerve deficit. She exhibits normal muscle tone. Coordination normal.   Skin: Skin is warm and dry. No rash noted. No erythema. No pallor.   Psychiatric: She has a normal mood and affect. Her behavior is normal.     Office Visit on 11/07/2018   Component Date Value Ref Range Status   • WBC 11/07/2018 6.20  4.50 - 10.70 10*3/mm3 Final   • RBC 11/07/2018 4.37  3.90 - 5.20 10*6/mm3 Final   • Hemoglobin 11/07/2018 11.6* 11.9 - 15.5 g/dL Final   • Hematocrit 11/07/2018 36.7  35.6 - 45.5 % Final   • MCV 11/07/2018 84.0  80.5 - 98.2 fL Final   • MCH 11/07/2018 26.5* 26.9 - 32.0 pg Final   • MCHC 11/07/2018 31.6* 32.4 - 36.3 g/dL Final   • RDW 11/07/2018 14.6* 11.7 - 13.0 % Final   • Platelets 11/07/2018 200  140 - 500 10*3/mm3 Final   • Neutrophil Rel % 11/07/2018 59.2  42.7 - 76.0 % Final   • Lymphocyte Rel % 11/07/2018 20.5  19.6 - 45.3 % Final   • Monocyte Rel % 11/07/2018 14.5* 5.0 - 12.0 % Final   • Eosinophil Rel % 11/07/2018 5.6  0.3 - 6.2 % Final   • Basophil Rel % 11/07/2018 0.2  0.0 - 1.5 % Final   • Neutrophils Absolute 11/07/2018 3.67  1.90 - 8.10 10*3/mm3 Final   • Lymphocytes Absolute 11/07/2018 1.27  0.90 - 4.80 10*3/mm3 Final   • Monocytes Absolute 11/07/2018 0.90  0.20 - 1.20 10*3/mm3 Final   • Eosinophils Absolute 11/07/2018 0.35  0.00 - 0.70 10*3/mm3 Final   • Basophils Absolute 11/07/2018 0.01  0.00 - 0.20 10*3/mm3 Final   • Immature Granulocyte Rel % 11/07/2018 0.2  0.0 - 0.5 % Final    • Immature Grans Absolute 11/07/2018 0.01  0.00 - 0.03 10*3/mm3 Final   • Glucose 11/07/2018 200* 65 - 99 mg/dL Final   • BUN 11/07/2018 34* 6 - 20 mg/dL Final   • Creatinine 11/07/2018 2.04* 0.57 - 1.00 mg/dL Final   • eGFR Non  Am 11/07/2018 25* >60 mL/min/1.73 Final   • eGFR African Am 11/07/2018 30* >60 mL/min/1.73 Final   • BUN/Creatinine Ratio 11/07/2018 16.7  7.0 - 25.0 Final   • Sodium 11/07/2018 136  136 - 145 mmol/L Final   • Potassium 11/07/2018 3.9  3.5 - 5.2 mmol/L Final   • Chloride 11/07/2018 89* 98 - 107 mmol/L Final   • Total CO2 11/07/2018 31.7* 22.0 - 29.0 mmol/L Final   • Calcium 11/07/2018 10.6* 8.6 - 10.5 mg/dL Final   • Total Protein 11/07/2018 8.1  6.0 - 8.5 g/dL Final   • Albumin 11/07/2018 4.60  3.50 - 5.20 g/dL Final   • Globulin 11/07/2018 3.5  gm/dL Final   • A/G Ratio 11/07/2018 1.3  g/dL Final   • Total Bilirubin 11/07/2018 0.2  0.1 - 1.2 mg/dL Final   • Alkaline Phosphatase 11/07/2018 123* 39 - 117 U/L Final   • AST (SGOT) 11/07/2018 18  1 - 32 U/L Final   • ALT (SGPT) 11/07/2018 21  1 - 33 U/L Final   • Free T4 11/07/2018 1.24  0.93 - 1.70 ng/dL Final   • TSH 11/07/2018 2.100  0.270 - 4.200 mIU/mL Final   • Hemoglobin A1C 11/07/2018 10.10* 4.80 - 5.60 % Final    Comment: Hemoglobin A1C Ranges:  Increased Risk for Diabetes  5.7% to 6.4%  Diabetes                     >= 6.5%  Diabetic Goal                < 7.0%     • Unable to Void 11/07/2018 Comment   Final    Comment: The patient was not able to render a urine sample and has been  instructed to return for a urine collection at their earliest  convenience.  The urine testing that you have requested has  been deleted from this report.  When the patient returns and  provides a urine specimen, the urine testing will be performed  and separately reported.       Assessment/Plan   Noemí was seen today for diabetes, hyperlipidemia and sleep apnea.    Diagnoses and all orders for this visit:    Type 2 diabetes mellitus with stage  3 chronic kidney disease, with long-term current use of insulin (CMS/[facility])  -     Microalbumin / Creatinine Urine Ratio - Urine, Clean Catch    Arteriosclerosis of coronary artery    Hyperlipidemia, unspecified hyperlipidemia type    Coronary artery disease involving native coronary artery of native heart without angina pectoris    Essential hypertension    Obstructive sleep apnea on CPAP    Microalbuminuria    Other orders  -     HUMALOG KWIKPEN 100 UNIT/ML solution pen-injector; 15 units with each meal  -     Insulin Glargine (LANTUS SOLOSTAR) 100 UNIT/ML injection pen; 35 units every morning and 10 units every evening      Increase Lantus to 35 units every morning and 10 units at bedtime  Increase Humalog to 15 units with each meal.  Check blood sugar before meals and at bedtime and call with results in one week.  Continue Crestor 5 mg once a day and low-fat diet.    Continue CPAP.  Continue isosorbide dinitrate, hydralazine, Bumex, metoprolol tartrate, and losartan per Dr. Foote and Dr. Woodard    Send copy of my note and labs to Dr. Foote and Dr. Woodard    RTC 4 mos

## 2018-11-30 LAB
ALBUMIN/CREAT UR: 272.2 MG/G CREAT (ref 0–30)
CREAT UR-MCNC: 51.8 MG/DL
INTERPRETATION: NORMAL
Lab: NORMAL
MICROALBUMIN UR-MCNC: 141 UG/ML

## 2018-12-20 RX ORDER — INSULIN LISPRO 100 [IU]/ML
INJECTION, SOLUTION INTRAVENOUS; SUBCUTANEOUS
Qty: 15 PEN | Refills: 2
Start: 2018-12-20 | End: 2019-07-31 | Stop reason: SDUPTHER

## 2019-02-11 RX ORDER — BLOOD-GLUCOSE METER
EACH MISCELLANEOUS
Qty: 1 KIT | Refills: 0 | Status: SHIPPED | OUTPATIENT
Start: 2019-02-11

## 2019-04-18 NOTE — PROGRESS NOTES
Subjective   Noemí Pedraza is a 58 y.o. female.     F/u for dm 2, hyperlipidemia, sleep apnea /testing bs 1-2 x day / last dm eye exam 6/28/18 with dr Rhodes / last dm foot exam today with dr Bowman           Patient has known diabetes mellitus since 1997 and started on insulin in 2009. She is on Lantus 35 units every AM and 10 units every PM  and Humalog 12-15 units with each meal.  She checks her blood sugar 2-3 times per day. She admits to dietary non-compliance.  Fasting blood sugar runs between 130-225.  Lunchtime blood sugar runs between 101-230.  Suppertime blood sugar runs between . She has denies severe hypoglycemia. She has gained 13 pounds since 11/18. Her last meal was last night.      Her last eye examination was 6/18. She had previous hemorrhage on right eye and had Avastin injection.  She had previous retinal laser treatment .  She has early cataracts.  She has microalbuminuria on urine sample taken last 11/18. She was taken off losartan in 2/19 by Yane Mosley.   She has occasional tingling in her feet but no pain.      She has hyperlipidemia and is not taking Crestor 5 mg/day regularly.  She denies any myalgia.  Livalo was discontinued because her insurance will not pay for it.  She stopped pravastatin since December 2016 because of leg shaking at night. Lipitor caused muscle weakness in the past. She has not used Zocor before.      She has history of coronary disease and ischemic cardiomyopathy and has a defibrillator in place. She had a myocardial infarction in 2009. She denies any chest pain, orthopnea, or paroxysmal nocturnal dyspnea. She has not had any defibrillator discharge.  Amiodarone was discontinued in 7/17. She was admitted at [facility] in January 2017 for congestive heart failure. She is on isosorbide dinitrate 20 mg 3 times a day, hydralazine 50 mg 3 times a day, and Bumex 2 mg once a day. She follows with Dr. Woodard.      She has no previous history of  "thyroid disease. Thyroid function test done in November 2018 showed normal TSH at 2.1 and normal free T4 at 1.24 ng per DL.    Her PCP is retiring next month    The following portions of the patient's history were reviewed and updated as appropriate: allergies, current medications, past family history, past medical history, past social history, past surgical history and problem list.    Review of Systems   Constitutional: Negative.    HENT: Negative.    Eyes: Negative.    Respiratory: Negative.    Cardiovascular: Negative.    Gastrointestinal: Negative.    Endocrine: Negative.    Genitourinary: Negative.    Musculoskeletal: Negative.    Skin: Negative.    Allergic/Immunologic: Negative.    Neurological: Positive for numbness (left foot ).   Hematological: Negative.    Psychiatric/Behavioral: Positive for sleep disturbance (sleep apnea using CPAP machine ).       Objective      Vitals:    04/19/19 0858   BP: 136/80   BP Location: Right arm   Patient Position: Sitting   Cuff Size: Large Adult   Pulse: 74   SpO2: 96%   Weight: 104 kg (229 lb 9.6 oz)   Height: 170.2 cm (67.01\")     Physical Exam   Constitutional: She is oriented to person, place, and time. She appears well-developed and well-nourished. No distress.   HENT:   Head: Normocephalic.   Nose: Nose normal.   Mouth/Throat: No oropharyngeal exudate.   Eyes: Conjunctivae and EOM are normal. Right eye exhibits no discharge. Left eye exhibits no discharge. No scleral icterus.   Neck: Neck supple. No JVD present. No tracheal deviation present. No thyromegaly present.   Cardiovascular: Normal rate, regular rhythm, normal heart sounds and intact distal pulses. Exam reveals no gallop and no friction rub.   No murmur heard.  Pulmonary/Chest: Effort normal and breath sounds normal. No stridor. No respiratory distress. She has no wheezes. She has no rales. She exhibits no tenderness.   Abdominal: Soft. Bowel sounds are normal. She exhibits no distension and no mass. There " is no tenderness. There is no rebound and no guarding.   Musculoskeletal: Normal range of motion. She exhibits no edema, tenderness or deformity.   No plantar ulcers   Lymphadenopathy:     She has no cervical adenopathy.   Neurological: She is alert and oriented to person, place, and time. She displays normal reflexes. Coordination normal.   Intact light touch   Skin: Skin is warm and dry. No erythema. No pallor.   Psychiatric: She has a normal mood and affect. Her behavior is normal.     Office Visit on 11/29/2018   Component Date Value Ref Range Status   • Creatinine, Urine 11/29/2018 51.8  Not Estab. mg/dL Final   • Microalbumin, Urine 11/29/2018 141.0  Not Estab. ug/mL Final   • Microalbumin/Creatinine Ratio 11/29/2018 272.2* 0.0 - 30.0 mg/g creat Final    Comment:                      Normal:                0.0 -  30.0                       Albuminuria:          31.0 - 300.0                       Clinical albuminuria:       >300.0     • Interpretation 11/29/2018 Note   Final    Comment: -------------------------------  CHRONIC KIDNEY DISEASE:  EGFR, BLOOD PRESSURE, AND PROTEINURIA ASSESSMENT  Most recent order does not include a Renal Panel.  EGFR, BLOOD PRESSURE, AND PROTEINURIA FOLLOW-UP  -  Spot Urine Panel (Albumin preferred) within 6 months;  -  BONE and MINERAL ASSESSMENT  Most recent order does not include a Renal Panel.  BONE and MINERAL FOLLOW-UP  -  fasting PTH with Renal Panel and 25-Hydroxy Vitamin D are  recommended by KDOQI guidelines, at least yearly;  -  LIPIDS ASSESSMENT  Most recent order does not include a fasting Lipid Panel.  LIPIDS FOLLOW-UP  -  fasting Lipid Panel is recommended by KDOQI guidelines, at  least yearly;  -  ANEMIA ASSESSMENT  Most recent order does not include a CBC Panel or iron  studies.  ANEMIA FOLLOW-UP  -  CBC is recommended by KDOQI guidelines, at least yearly;  -------------------------------  DISCLAIMER  These assessments and treatment suggestions are provided as  a  convenience in support of the physician-patient  relations                           hip and are not intended to replace the physician's  clinical judgment. They are derived from national guidelines  in addition to other evidence and expert opinion. The  clinician should consider this information within the  context of clinical opinion and the individual patient.  SEE GUIDANCE FOR CHRONIC KIDNEY DISEASE PROGRAM: Kidney  Disease Improving Global Outcomes (KDIGO) clinical practice  guidelines are at http://kdigo.org/home/guidelines/.  National Kid[employer] Kidney Disease Outcomes Quality  Initiative (KDOQI (TM)), with its limitations and  disclaimers, are at www.kidney.org/professionals/KDOQI. This  program is intended for patients who have been diagnosed  with stages 3, 4, or pre-dialysis 5 CKD. It is not intended  for children, pregnant patients, or transplant patients.     • PDF Image 11/29/2018 Not applicable   Final     Assessment/Plan   Noemí was seen today for diabetes, hyperlipidemia and sleep apnea.    Diagnoses and all orders for this visit:    Type 2 diabetes mellitus with stage 4 chronic kidney disease, with long-term current use of insulin (CMS/[facility])  -     Comprehensive Metabolic Panel  -     Lipid Panel  -     Hemoglobin A1c  -     TSH  -     T4, Free  -     CBC & Differential  -     Vitamin D 25 Hydroxy  -     PTH, Intact  -     Glutamic Acid Decarboxylase  -     C-Peptide  -     Iron Profile  -     Ferritin    Ischemic cardiomyopathy  -     Comprehensive Metabolic Panel    Hyperlipidemia, unspecified hyperlipidemia type  -     Comprehensive Metabolic Panel  -     Lipid Panel    Poorly controlled type 2 diabetes mellitus (CMS/HCC)  -     Comprehensive Metabolic Panel      Discontinue Lantus due to cost.  Tresiba 50 units every morning starting today.  Continue mealtime Humalog.  Prescription for Dexcom 6 sensor was given to the patient.  Take Crestor 5 mg/day regularly  Will defer treatment of  hypertension and CHF to Dr. Woodard.  Advised to follow-up with ophthalmologist.    Send copy of my note to Dr. Foote, Dr. Rhodes and Dr. Woodard    RTC 4 mos

## 2019-04-19 ENCOUNTER — OFFICE VISIT (OUTPATIENT)
Dept: ENDOCRINOLOGY | Age: 59
End: 2019-04-19

## 2019-04-19 VITALS
BODY MASS INDEX: 36.03 KG/M2 | HEART RATE: 74 BPM | HEIGHT: 67 IN | DIASTOLIC BLOOD PRESSURE: 80 MMHG | OXYGEN SATURATION: 96 % | SYSTOLIC BLOOD PRESSURE: 136 MMHG | WEIGHT: 229.6 LBS

## 2019-04-19 DIAGNOSIS — E11.22 TYPE 2 DIABETES MELLITUS WITH STAGE 4 CHRONIC KIDNEY DISEASE, WITH LONG-TERM CURRENT USE OF INSULIN (HCC): Primary | ICD-10-CM

## 2019-04-19 DIAGNOSIS — I25.5 ISCHEMIC CARDIOMYOPATHY: ICD-10-CM

## 2019-04-19 DIAGNOSIS — Z79.4 TYPE 2 DIABETES MELLITUS WITH STAGE 4 CHRONIC KIDNEY DISEASE, WITH LONG-TERM CURRENT USE OF INSULIN (HCC): Primary | ICD-10-CM

## 2019-04-19 DIAGNOSIS — E11.65 POORLY CONTROLLED TYPE 2 DIABETES MELLITUS (HCC): ICD-10-CM

## 2019-04-19 DIAGNOSIS — N18.4 TYPE 2 DIABETES MELLITUS WITH STAGE 4 CHRONIC KIDNEY DISEASE, WITH LONG-TERM CURRENT USE OF INSULIN (HCC): Primary | ICD-10-CM

## 2019-04-19 DIAGNOSIS — E78.5 HYPERLIPIDEMIA, UNSPECIFIED HYPERLIPIDEMIA TYPE: ICD-10-CM

## 2019-04-19 PROCEDURE — 99214 OFFICE O/P EST MOD 30 MIN: CPT | Performed by: INTERNAL MEDICINE

## 2019-04-22 LAB
25(OH)D3+25(OH)D2 SERPL-MCNC: 16.7 NG/ML (ref 30–100)
ALBUMIN SERPL-MCNC: 4.5 G/DL (ref 3.5–5.2)
ALBUMIN/GLOB SERPL: 1.4 G/DL
ALP SERPL-CCNC: 124 U/L (ref 39–117)
ALT SERPL-CCNC: 15 U/L (ref 1–33)
AST SERPL-CCNC: 19 U/L (ref 1–32)
BASOPHILS # BLD AUTO: 0.02 10*3/MM3 (ref 0–0.2)
BASOPHILS NFR BLD AUTO: 0.4 % (ref 0–1.5)
BILIRUB SERPL-MCNC: 0.5 MG/DL (ref 0.2–1.2)
BUN SERPL-MCNC: 23 MG/DL (ref 8–23)
BUN/CREAT SERPL: 15.6 (ref 7–25)
C PEPTIDE SERPL-MCNC: 2.1 NG/ML (ref 1.1–4.4)
CALCIUM SERPL-MCNC: 9.7 MG/DL (ref 8.6–10.5)
CHLORIDE SERPL-SCNC: 97 MMOL/L (ref 98–107)
CHOLEST SERPL-MCNC: 243 MG/DL (ref 0–200)
CO2 SERPL-SCNC: 28.3 MMOL/L (ref 22–29)
CREAT SERPL-MCNC: 1.47 MG/DL (ref 0.57–1)
EOSINOPHIL # BLD AUTO: 0.2 10*3/MM3 (ref 0–0.4)
EOSINOPHIL NFR BLD AUTO: 3.7 % (ref 0.3–6.2)
ERYTHROCYTE [DISTWIDTH] IN BLOOD BY AUTOMATED COUNT: 15 % (ref 12.3–15.4)
FERRITIN SERPL-MCNC: 97.4 NG/ML (ref 13–150)
GAD65 AB SER IA-ACNC: <5 U/ML (ref 0–5)
GLOBULIN SER CALC-MCNC: 3.3 GM/DL
GLUCOSE SERPL-MCNC: 266 MG/DL (ref 65–99)
HBA1C MFR BLD: 8.4 % (ref 4.8–5.6)
HCT VFR BLD AUTO: 39 % (ref 34–46.6)
HDLC SERPL-MCNC: 55 MG/DL (ref 40–60)
HGB BLD-MCNC: 11.8 G/DL (ref 12–15.9)
IMM GRANULOCYTES # BLD AUTO: 0.01 10*3/MM3 (ref 0–0.05)
IMM GRANULOCYTES NFR BLD AUTO: 0.2 % (ref 0–0.5)
INTERPRETATION: NORMAL
INTERPRETATION: NORMAL
IRON SATN MFR SERPL: 14 % (ref 20–50)
IRON SERPL-MCNC: 61 MCG/DL (ref 37–145)
LDLC SERPL CALC-MCNC: 160 MG/DL (ref 0–100)
LYMPHOCYTES # BLD AUTO: 0.85 10*3/MM3 (ref 0.7–3.1)
LYMPHOCYTES NFR BLD AUTO: 15.9 % (ref 19.6–45.3)
Lab: NORMAL
Lab: NORMAL
MCH RBC QN AUTO: 26 PG (ref 26.6–33)
MCHC RBC AUTO-ENTMCNC: 30.3 G/DL (ref 31.5–35.7)
MCV RBC AUTO: 85.9 FL (ref 79–97)
MONOCYTES # BLD AUTO: 0.64 10*3/MM3 (ref 0.1–0.9)
MONOCYTES NFR BLD AUTO: 12 % (ref 5–12)
NEUTROPHILS # BLD AUTO: 3.63 10*3/MM3 (ref 1.7–7)
NEUTROPHILS NFR BLD AUTO: 67.8 % (ref 42.7–76)
NRBC BLD AUTO-RTO: 0 /100 WBC (ref 0–0.2)
PLATELET # BLD AUTO: 203 10*3/MM3 (ref 140–450)
POTASSIUM SERPL-SCNC: 4.2 MMOL/L (ref 3.5–5.2)
PROT SERPL-MCNC: 7.8 G/DL (ref 6–8.5)
PTH-INTACT SERPL-MCNC: 88 PG/ML (ref 15–65)
RBC # BLD AUTO: 4.54 10*6/MM3 (ref 3.77–5.28)
SODIUM SERPL-SCNC: 140 MMOL/L (ref 136–145)
T4 FREE SERPL-MCNC: 1.18 NG/DL (ref 0.93–1.7)
TIBC SERPL-MCNC: 425 MCG/DL
TRIGL SERPL-MCNC: 139 MG/DL (ref 0–150)
TSH SERPL DL<=0.005 MIU/L-ACNC: 3 MIU/ML (ref 0.27–4.2)
UIBC SERPL-MCNC: 364 MCG/DL
VLDLC SERPL CALC-MCNC: 27.8 MG/DL
WBC # BLD AUTO: 5.35 10*3/MM3 (ref 3.4–10.8)

## 2019-04-22 RX ORDER — ROSUVASTATIN CALCIUM 5 MG/1
5 TABLET, COATED ORAL DAILY
Qty: 30 TABLET | Refills: 5 | Status: SHIPPED | OUTPATIENT
Start: 2019-04-22 | End: 2019-12-23

## 2019-04-22 RX ORDER — CHOLECALCIFEROL (VITAMIN D3) 125 MCG
1 CAPSULE ORAL DAILY
Qty: 30 TABLET
Start: 2019-04-22 | End: 2019-05-08

## 2019-05-08 ENCOUNTER — OFFICE VISIT (OUTPATIENT)
Dept: INTERNAL MEDICINE | Facility: CLINIC | Age: 59
End: 2019-05-08

## 2019-05-08 VITALS
HEIGHT: 67 IN | TEMPERATURE: 98.2 F | SYSTOLIC BLOOD PRESSURE: 134 MMHG | RESPIRATION RATE: 17 BRPM | BODY MASS INDEX: 37.04 KG/M2 | WEIGHT: 236 LBS | OXYGEN SATURATION: 96 % | DIASTOLIC BLOOD PRESSURE: 79 MMHG | HEART RATE: 75 BPM

## 2019-05-08 DIAGNOSIS — I10 ESSENTIAL HYPERTENSION: ICD-10-CM

## 2019-05-08 DIAGNOSIS — E78.2 MIXED HYPERLIPIDEMIA: Primary | ICD-10-CM

## 2019-05-08 DIAGNOSIS — E11.22 TYPE 2 DIABETES MELLITUS WITH STAGE 1 CHRONIC KIDNEY DISEASE, WITH LONG-TERM CURRENT USE OF INSULIN (HCC): ICD-10-CM

## 2019-05-08 DIAGNOSIS — Z79.4 TYPE 2 DIABETES MELLITUS WITH STAGE 1 CHRONIC KIDNEY DISEASE, WITH LONG-TERM CURRENT USE OF INSULIN (HCC): ICD-10-CM

## 2019-05-08 DIAGNOSIS — N18.1 TYPE 2 DIABETES MELLITUS WITH STAGE 1 CHRONIC KIDNEY DISEASE, WITH LONG-TERM CURRENT USE OF INSULIN (HCC): ICD-10-CM

## 2019-05-08 DIAGNOSIS — I25.10 CORONARY ARTERY DISEASE INVOLVING NATIVE CORONARY ARTERY OF NATIVE HEART WITHOUT ANGINA PECTORIS: ICD-10-CM

## 2019-05-08 DIAGNOSIS — J45.991 COUGH VARIANT ASTHMA: ICD-10-CM

## 2019-05-08 PROCEDURE — 99214 OFFICE O/P EST MOD 30 MIN: CPT | Performed by: INTERNAL MEDICINE

## 2019-05-24 NOTE — PROGRESS NOTES
Subjective   Noemí Pedraza is a 58 y.o. female.   She is here today for mixed hyperlipidemia hypertension CAD cough variant asthma and type 2 diabetes  History of Present Illness   She is here today for mixed hyper lipidemia which is stable on current medication hypertension stable on current medication CAD which is stable with no chest pain cough variant asthma which is stable and type 2 diabetes which is not stable  The following portions of the patient's history were reviewed and updated as appropriate: allergies, current medications, past family history, past medical history, past social history, past surgical history and problem list.    Review of Systems   Constitutional: Negative for fatigue.   Endocrine: Negative for polydipsia and polyuria.   Genitourinary: Negative for difficulty urinating.   Neurological: Negative for weakness.   All other systems reviewed and are negative.      Objective   Physical Exam   Constitutional: She is oriented to person, place, and time. She appears well-developed and well-nourished. She is cooperative.   HENT:   Head: Normocephalic and atraumatic.   Right Ear: Hearing, tympanic membrane, external ear and ear canal normal.   Left Ear: Hearing, tympanic membrane, external ear and ear canal normal.   Nose: Nose normal.   Mouth/Throat: Uvula is midline, oropharynx is clear and moist and mucous membranes are normal.   Eyes: Conjunctivae, EOM and lids are normal. Pupils are equal, round, and reactive to light.   Neck: Phonation normal. Neck supple. Carotid bruit is not present.   Cardiovascular: Normal rate, regular rhythm and normal heart sounds. Exam reveals no gallop and no friction rub.   No murmur heard.  Pulmonary/Chest: Effort normal and breath sounds normal. No respiratory distress.   Abdominal: Soft. Bowel sounds are normal. She exhibits no distension and no mass. There is no hepatosplenomegaly. There is no tenderness. There is no rebound and no guarding. No hernia.    Musculoskeletal: She exhibits no edema.   Neurological: She is alert and oriented to person, place, and time. Coordination and gait normal.   Skin: Skin is warm and dry.   Psychiatric: She has a normal mood and affect. Her speech is normal and behavior is normal. Judgment and thought content normal.   Nursing note and vitals reviewed.      Assessment/Plan   Diagnoses and all orders for this visit:    Mixed hyperlipidemia    Essential hypertension    Coronary artery disease involving native coronary artery of native heart without angina pectoris    Cough variant asthma    Type 2 diabetes mellitus with stage 1 chronic kidney disease, with long-term current use of insulin (CMS/[facility])        Mixed hyperlipidemia stable on current medication  Hypertension stable on current medication  CAD stable with no chest pain  Cough variant asthma stable on current inhalers  Type 2 diabetes not stable and she will follow endocrinology advice

## 2019-07-23 RX ORDER — INSULIN LISPRO 100 [IU]/ML
INJECTION, SOLUTION INTRAVENOUS; SUBCUTANEOUS
Qty: 15 ML | Refills: 2 | Status: SHIPPED | OUTPATIENT
Start: 2019-07-23 | End: 2019-07-31 | Stop reason: SDUPTHER

## 2019-07-31 ENCOUNTER — TELEPHONE (OUTPATIENT)
Dept: ENDOCRINOLOGY | Age: 59
End: 2019-07-31

## 2019-07-31 RX ORDER — INSULIN LISPRO 100 [IU]/ML
INJECTION, SOLUTION INTRAVENOUS; SUBCUTANEOUS
Qty: 45 ML | Refills: 1 | Status: SHIPPED | OUTPATIENT
Start: 2019-07-31 | End: 2019-07-31 | Stop reason: ALTCHOICE

## 2019-07-31 NOTE — TELEPHONE ENCOUNTER
----- Message from Patricia Sosa sent at 7/31/2019  1:01 PM EDT -----  Pt called back with the right number to her new pharmacy the one I gave you was wrong the new correct number is [phone]

## 2019-08-06 ENCOUNTER — PRIOR AUTHORIZATION (OUTPATIENT)
Dept: ENDOCRINOLOGY | Age: 59
End: 2019-08-06

## 2019-12-23 RX ORDER — ROSUVASTATIN CALCIUM 5 MG/1
TABLET, COATED ORAL
Qty: 30 TABLET | Refills: 0 | Status: SHIPPED | OUTPATIENT
Start: 2019-12-23

## 2021-03-22 ENCOUNTER — BULK ORDERING (OUTPATIENT)
Dept: CASE MANAGEMENT | Facility: OTHER | Age: 61
End: 2021-03-22

## 2021-03-22 DIAGNOSIS — Z23 IMMUNIZATION DUE: ICD-10-CM

## 2024-06-19 ENCOUNTER — READMISSION MANAGEMENT (OUTPATIENT)
Dept: CALL CENTER | Facility: HOSPITAL | Age: 64
End: 2024-06-19
Payer: COMMERCIAL

## 2024-06-19 NOTE — OUTREACH NOTE
Prep Survey      Flowsheet Row Responses   Denominational facility patient discharged from? Non-[facility]   Is LACE score < 7 ? Non-[facility] Discharge   Eligibility Not Eligible   What are the reasons patient is not eligible? Other  [> 3 yrs since PCP appt]   Does the patient have one of the following disease processes/diagnoses(primary or secondary)? Other   Prep survey completed? Yes            Jocelynn Bullock Registered Nurse